# Patient Record
Sex: FEMALE | HISPANIC OR LATINO | Employment: UNEMPLOYED | URBAN - METROPOLITAN AREA
[De-identification: names, ages, dates, MRNs, and addresses within clinical notes are randomized per-mention and may not be internally consistent; named-entity substitution may affect disease eponyms.]

---

## 2019-04-11 ENCOUNTER — OFFICE VISIT (OUTPATIENT)
Dept: URGENT CARE | Facility: PHYSICIAN GROUP | Age: 19
End: 2019-04-11
Payer: COMMERCIAL

## 2019-04-11 VITALS
WEIGHT: 135 LBS | HEART RATE: 76 BPM | OXYGEN SATURATION: 97 % | RESPIRATION RATE: 16 BRPM | SYSTOLIC BLOOD PRESSURE: 116 MMHG | TEMPERATURE: 98.7 F | DIASTOLIC BLOOD PRESSURE: 80 MMHG

## 2019-04-11 DIAGNOSIS — J02.9 SORE THROAT: ICD-10-CM

## 2019-04-11 DIAGNOSIS — J03.90 ACUTE TONSILLITIS, UNSPECIFIED ETIOLOGY: ICD-10-CM

## 2019-04-11 DIAGNOSIS — R19.7 DIARRHEA, UNSPECIFIED TYPE: ICD-10-CM

## 2019-04-11 PROCEDURE — 99214 OFFICE O/P EST MOD 30 MIN: CPT | Performed by: NURSE PRACTITIONER

## 2019-04-11 RX ORDER — AZITHROMYCIN 250 MG/1
TABLET, FILM COATED ORAL
Qty: 6 TAB | Refills: 0 | Status: SHIPPED | OUTPATIENT
Start: 2019-04-11 | End: 2021-07-15

## 2019-04-11 ASSESSMENT — ENCOUNTER SYMPTOMS
DIZZINESS: 0
EYE REDNESS: 0
ABDOMINAL PAIN: 0
DIARRHEA: 1
EYE DISCHARGE: 0
SINUS PAIN: 0
CHILLS: 1
SPUTUM PRODUCTION: 0
NAUSEA: 0
NECK PAIN: 0
HEADACHES: 0
SORE THROAT: 1
COUGH: 0
FEVER: 1
SHORTNESS OF BREATH: 0
VOMITING: 0

## 2019-04-11 ASSESSMENT — LIFESTYLE VARIABLES: SUBSTANCE_ABUSE: 0

## 2019-04-11 NOTE — PROGRESS NOTES
Subjective:      Celia Spangler is a 19 y.o. female who presents with Pharyngitis (x 5 days,hurts to swollow,diarrhea x 1 week)    Denies past medical, surgical or family history that is significant to today's problem.   RX or OTC medications-- reviewed with patient today.   No Known Allergies          HPI this a new problem.  Alee is a 19-year-old female who presents with sore throat for 5 days.  It is getting significantly worse for the past 24 hours.  She has had subjective fevers and sweats for 3 days.  She has had intermittent diarrhea for 1 week-less than 3 times per day.  It hurts every time time she tries to swallow.  She denies cough, body aches(except for when she has the fever), nasal congestion.  She has taken over-the-counter antipyretics that of reduced her fever.  She has no other aggravating or alleviating factors.  No recent travel, antibiotic use or ill contacts.    Review of Systems   Constitutional: Positive for chills and fever. Negative for malaise/fatigue.   HENT: Positive for sore throat. Negative for congestion, ear pain and sinus pain.    Eyes: Negative for discharge and redness.   Respiratory: Negative for cough, sputum production and shortness of breath.    Cardiovascular: Negative for chest pain.   Gastrointestinal: Positive for diarrhea. Negative for abdominal pain, nausea and vomiting.   Genitourinary: Negative for dysuria and urgency.   Musculoskeletal: Negative for neck pain.   Neurological: Negative for dizziness and headaches.   Endo/Heme/Allergies: Negative for environmental allergies.   Psychiatric/Behavioral: Negative for substance abuse.   All other systems reviewed and are negative.         Objective:     /80 (BP Location: Right arm, Patient Position: Sitting, BP Cuff Size: Adult)   Pulse 76   Temp 37.1 °C (98.7 °F) (Temporal)   Resp 16   Wt 61.2 kg (135 lb)   SpO2 97%      Physical Exam   Constitutional: She is oriented to person, place, and time. Vital signs  are normal. She appears well-developed and well-nourished.  Non-toxic appearance. No distress.   HENT:   Head: Normocephalic.   Right Ear: Hearing, tympanic membrane, external ear and ear canal normal.   Left Ear: Hearing, tympanic membrane, external ear and ear canal normal.   Nose: Nose normal. Right sinus exhibits no frontal sinus tenderness. Left sinus exhibits no frontal sinus tenderness.   Mouth/Throat: Uvula is midline and mucous membranes are normal. No uvula swelling. Posterior oropharyngeal erythema present. No oropharyngeal exudate, posterior oropharyngeal edema or tonsillar abscesses. Tonsils are 2+ on the right. Tonsils are 2+ on the left. No tonsillar exudate.   Eyes: Pupils are equal, round, and reactive to light. Lids are normal.   Neck: Trachea normal, normal range of motion, full passive range of motion without pain and phonation normal. Neck supple.   Cardiovascular: Normal rate, regular rhythm and normal pulses.    Pulmonary/Chest: Effort normal and breath sounds normal. No respiratory distress.   Abdominal: Soft. Bowel sounds are normal. There is no hepatosplenomegaly. There is generalized tenderness. There is no rigidity, no rebound, no guarding and no CVA tenderness.   Musculoskeletal: Normal range of motion.   Lymphadenopathy:        Head (right side): Tonsillar adenopathy present. No submental and no submandibular adenopathy present.        Head (left side): No submental, no submandibular and no tonsillar adenopathy present.     She has no cervical adenopathy.        Right: No supraclavicular adenopathy present.        Left: No supraclavicular adenopathy present.   Neurological: She is alert and oriented to person, place, and time. She has normal reflexes.   Skin: Skin is warm and dry.   Psychiatric: She has a normal mood and affect. Her speech is normal and behavior is normal.   Nursing note and vitals reviewed.              Assessment/Plan:     1. Acute tonsillitis, unspecified etiology  -  azithromycin (ZITHROMAX) 250 MG Tab; Take 2 tablets PO on day one, then 1 tablet PO on day two to five.  Dispense: 6 Tab; Refill: 0    2. Diarrhea, unspecified type    OTC anti-diarrhea medication PRN. Dosage and directions per     3. Sore throat  - Alum & Mag Hydroxide-Simeth (MBX) Suspension; Take 5 mL by mouth every 6 hours as needed (gargle and spit or swallow).  Dispense: 120 mL; Refill: 0    OTC Antipyretic of choice (Acetaminophen, Ibuprofen) for fevers greater than or equal to 101.5 degrees.     Return to clinic or PCP  4-5  days if current symptoms are not resolving in a satisfactory manner or sooner if new or worsening symptoms occur.   Patient was advised of signs and symptoms which would warrant further evaluation and /or emergent evaluation in ER.  Verbalized agreement with this treatment plan and seemed to understand without barriers. Questions were encouraged and answered to patients satisfaction.     Aftercare instructions were given to pt    Keep well hydrated

## 2019-04-11 NOTE — PATIENT INSTRUCTIONS
Tonsillitis  Tonsillitis is an infection of the throat that causes the tonsils to become red, tender, and swollen. Tonsils are collections of lymphoid tissue at the back of the throat. Each tonsil has crevices (crypts). Tonsils help fight nose and throat infections and keep infection from spreading to other parts of the body for the first 18 months of life.  What are the causes?  Sudden (acute) tonsillitis is usually caused by infection with streptococcal bacteria. Long-lasting (chronic) tonsillitis occurs when the crypts of the tonsils become filled with pieces of food and bacteria, which makes it easy for the tonsils to become repeatedly infected.  What are the signs or symptoms?  Symptoms of tonsillitis include:  · A sore throat, with possible difficulty swallowing.  · White patches on the tonsils.  · Fever.  · Tiredness.  · New episodes of snoring during sleep, when you did not snore before.  · Small, foul-smelling, yellowish-white pieces of material (tonsilloliths) that you occasionally cough up or spit out. The tonsilloliths can also cause you to have bad breath.  How is this diagnosed?  Tonsillitis can be diagnosed through a physical exam. Diagnosis can be confirmed with the results of lab tests, including a throat culture.  How is this treated?  The goals of tonsillitis treatment include the reduction of the severity and duration of symptoms and prevention of associated conditions. Symptoms of tonsillitis can be improved with the use of steroids to reduce the swelling. Tonsillitis caused by bacteria can be treated with antibiotic medicines. Usually, treatment with antibiotic medicines is started before the cause of the tonsillitis is known. However, if it is determined that the cause is not bacterial, antibiotic medicines will not treat the tonsillitis. If attacks of tonsillitis are severe and frequent, your health care provider may recommend surgery to remove the tonsils (tonsillectomy).  Follow these  instructions at home:  · Rest as much as possible and get plenty of sleep.  · Drink plenty of fluids. While the throat is very sore, eat soft foods or liquids, such as sherbet, soups, or instant breakfast drinks.  · Eat frozen ice pops.  · Gargle with a warm or cold liquid to help soothe the throat. Mix 1/4 teaspoon of salt and 1/4 teaspoon of baking soda in 8 oz of water.  Contact a health care provider if:  · Large, tender lumps develop in your neck.  · A rash develops.  · A green, yellow-brown, or bloody substance is coughed up.  · You are unable to swallow liquids or food for 24 hours.  · You notice that only one of the tonsils is swollen.  Get help right away if:  · You develop any new symptoms such as vomiting, severe headache, stiff neck, chest pain, or trouble breathing or swallowing.  · You have severe throat pain along with drooling or voice changes.  · You have severe pain, unrelieved with recommended medications.  · You are unable to fully open the mouth.  · You develop redness, swelling, or severe pain anywhere in the neck.  · You have a fever.  This information is not intended to replace advice given to you by your health care provider. Make sure you discuss any questions you have with your health care provider.  Document Released: 09/27/2006 Document Revised: 05/25/2017 Document Reviewed: 06/06/2014  [employer] Interactive Patient Education © 2017 [employer]        Viral Gastroenteritis, Adult  Viral gastroenteritis is also known as the stomach flu. This condition is caused by various viruses. These viruses can be passed from person to person very easily (are very contagious). This condition may affect your stomach, small intestine, and large intestine. It can cause sudden watery diarrhea, fever, and vomiting.  Diarrhea and vomiting can make you feel weak and cause you to become dehydrated. You may not be able to keep fluids down. Dehydration can make you tired and thirsty, cause you to have a dry  mouth, and decrease how often you urinate. Older adults and people with other diseases or a weak immune system are at higher risk for dehydration.  It is important to replace the fluids that you lose from diarrhea and vomiting. If you become severely dehydrated, you may need to get fluids through an IV tube.  What are the causes?  Gastroenteritis is caused by various viruses, including rotavirus and norovirus. Norovirus is the most common cause in adults.  You can get sick by eating food, drinking water, or touching a surface contaminated with one of these viruses. You can also get sick from sharing utensils or other personal items with an infected person.  What increases the risk?  This condition is more likely to develop in people:  · Who have a weak defense system (immune system).  · Who live with one or more children who are younger than 2 years old.  · Who live in a nursing home.  · Who go on cruise ships.  What are the signs or symptoms?  Symptoms of this condition start suddenly 1-2 days after exposure to a virus. Symptoms may last a few days or as long as a week. The most common symptoms are watery diarrhea and vomiting. Other symptoms include:  · Fever.  · Headache.  · Fatigue.  · Pain in the abdomen.  · Chills.  · Weakness.  · Nausea.  · Muscle aches.  · Loss of appetite.  How is this diagnosed?  This condition is diagnosed with a medical history and physical exam. You may also have a stool test to check for viruses or other infections.  How is this treated?  This condition typically goes away on its own. The focus of treatment is to restore lost fluids (rehydration). Your health care provider may recommend that you take an oral rehydration solution (ORS) to replace important salts and minerals (electrolytes) in your body. Severe cases of this condition may require giving fluids through an IV tube.  Treatment may also include medicine to help with your symptoms.  Follow these instructions at home:  Follow  instructions from your health care provider about how to care for yourself at home.  Eating and drinking  Follow these recommendations as told by your health care provider:  · Take an ORS. This is a drink that is sold at pharmacies and retail stores.  · Drink clear fluids in small amounts as you are able. Clear fluids include water, ice chips, diluted fruit juice, and low-calorie sports drinks.  · Eat bland, easy-to-digest foods in small amounts as you are able. These foods include bananas, applesauce, rice, lean meats, toast, and crackers.  · Avoid fluids that contain a lot of sugar or caffeine, such as energy drinks, sports drinks, and soda.  · Avoid alcohol.  · Avoid spicy or fatty foods.  General instructions  · Drink enough fluid to keep your urine clear or pale yellow.  · Wash your hands often. If soap and water are not available, use hand .  · Make sure that all people in your household wash their hands well and often.  · Take over-the-counter and prescription medicines only as told by your health care provider.  · Rest at home while you recover.  · Watch your condition for any changes.  · Take a warm bath to relieve any burning or pain from frequent diarrhea episodes.  · Keep all follow-up visits as told by your health care provider. This is important.  Contact a health care provider if:  · You cannot keep fluids down.  · Your symptoms get worse.  · You have new symptoms.  · You feel light-headed or dizzy.  · You have muscle cramps.  Get help right away if:  · You have chest pain.  · You feel extremely weak or you faint.  · You see blood in your vomit.  · Your vomit looks like coffee grounds.  · You have bloody or black stools or stools that look like tar.  · You have a severe headache, a stiff neck, or both.  · You have a rash.  · You have severe pain, cramping, or bloating in your abdomen.  · You have trouble breathing or you are breathing very quickly.  · Your heart is beating very  quickly.  · Your skin feels cold and clammy.  · You feel confused.  · You have pain when you urinate.  · You have signs of dehydration, such as:  ¨ Dark urine, very little urine, or no urine.  ¨ Cracked lips.  ¨ Dry mouth.  ¨ Sunken eyes.  ¨ Sleepiness.  ¨ Weakness.  This information is not intended to replace advice given to you by your health care provider. Make sure you discuss any questions you have with your health care provider.  Document Released: 12/18/2006 Document Revised: 05/31/2017 Document Reviewed: 08/23/2016  [employer] Interactive Patient Education © 2017 [employer]

## 2019-04-11 NOTE — LETTER
April 11, 2019       Patient: Celia Spangler   YOB: 2000   Date of Visit: 4/11/2019         To Whom It May Concern:    It is my medical opinion that Celia Spangler remain out of work until 04/12/19 due to medical illness.              Sincerely,          KAYE Vitale  Electronically Signed

## 2019-11-09 ENCOUNTER — OFFICE VISIT (OUTPATIENT)
Dept: URGENT CARE | Facility: PHYSICIAN GROUP | Age: 19
End: 2019-11-09
Payer: COMMERCIAL

## 2019-11-09 VITALS
HEIGHT: 62 IN | DIASTOLIC BLOOD PRESSURE: 58 MMHG | OXYGEN SATURATION: 98 % | HEART RATE: 84 BPM | RESPIRATION RATE: 16 BRPM | BODY MASS INDEX: 24.66 KG/M2 | SYSTOLIC BLOOD PRESSURE: 100 MMHG | WEIGHT: 134 LBS | TEMPERATURE: 98.9 F

## 2019-11-09 DIAGNOSIS — K52.9 GASTROENTERITIS: ICD-10-CM

## 2019-11-09 PROCEDURE — 99214 OFFICE O/P EST MOD 30 MIN: CPT | Performed by: PHYSICIAN ASSISTANT

## 2019-11-09 RX ORDER — OMEPRAZOLE 20 MG/1
20 CAPSULE, DELAYED RELEASE ORAL DAILY
COMMUNITY
End: 2021-07-15

## 2019-11-09 RX ORDER — IBUPROFEN 200 MG
200 TABLET ORAL EVERY 6 HOURS PRN
COMMUNITY
End: 2023-03-16

## 2019-11-09 RX ORDER — ONDANSETRON 4 MG/1
4 TABLET, FILM COATED ORAL EVERY 6 HOURS PRN
Qty: 20 TAB | Refills: 1 | Status: SHIPPED | OUTPATIENT
Start: 2019-11-09 | End: 2021-07-15

## 2019-11-09 ASSESSMENT — ENCOUNTER SYMPTOMS
SORE THROAT: 0
HEARTBURN: 1
FEVER: 0
SHORTNESS OF BREATH: 0
ABDOMINAL PAIN: 0
WHEEZING: 0
NERVOUS/ANXIOUS: 0
BLOOD IN STOOL: 0
VOMITING: 0
NAUSEA: 0
CHILLS: 0
SPUTUM PRODUCTION: 0
DIARRHEA: 0
CONSTIPATION: 0
COUGH: 0

## 2019-11-09 NOTE — LETTER
November 9, 2019       Patient: Ariela Magallanes   YOB: 2000   Date of Visit: 11/9/2019         To Whom It May Concern:    It is my medical opinion that Ariela Magallanes should be excused from work for today and tomorrow due to illness.      If you have any questions or concerns, please don't hesitate to call [phone]          Sincerely,          León Bella P.A.-C.  Electronically Signed

## 2019-11-10 NOTE — PROGRESS NOTES
"Subjective:   Ariela Magallanes  is a 19 y.o. female who presents for Nausea (chills, body aches, x 8 hours has flu vaccine on board)        Patient comes clinic describing episode of diarrhea sensation of anxiety that occurred this morning because her concern.  She denies fevers chills but notes awoke feeling \"shaky\" with loose stool.  Denies blood per stool noted diarrhea of 2-3 episodes this morning prior to her work to start time.  She reports calling into work and shortly thereafter having progressive resolution of symptoms over the course of the day.  She denies fevers chills.  She denies sore throat cough or ear pain.  She denies dysuria frequency urgency or hematuria.  She notes all symptoms have completely resolved over the course of the day.  She denies suspect food sources, spoiled foods, others with the same symptoms, travel out of the country, drinking preparing water from outdoor sources or new medications recently.  She is tried no treatments but has had progressive resolution of all symptoms.  Notes past medical history of GERD she reports taking omeprazole today with improved symptoms.    Review of Systems   Constitutional: Negative for chills and fever.   HENT: Negative for congestion, ear pain and sore throat.    Respiratory: Negative for cough, sputum production, shortness of breath and wheezing.    Gastrointestinal: Positive for heartburn. Negative for abdominal pain, blood in stool, constipation, diarrhea ( resolved), melena, nausea and vomiting.   Genitourinary: Negative.  Negative for dysuria, frequency and hematuria.   Skin: Negative for rash.   Psychiatric/Behavioral: The patient is not nervous/anxious ( resolved).      No Known Allergies   Objective:   /58 (BP Location: Left arm, Patient Position: Sitting, BP Cuff Size: Adult)   Pulse 84   Temp 37.2 °C (98.9 °F) (Temporal)   Resp 16   Ht 1.575 m (5' 2\")   Wt 60.8 kg (134 lb)   LMP 10/19/2019 (Approximate)   SpO2 98%   BMI " 24.51 kg/m²   Physical Exam  Vitals signs and nursing note reviewed.   Constitutional:       General: She is not in acute distress.     Appearance: Normal appearance. She is well-developed. She is not diaphoretic.   HENT:      Head: Normocephalic and atraumatic.      Right Ear: Tympanic membrane, ear canal and external ear normal.      Left Ear: Tympanic membrane, ear canal and external ear normal.      Nose: Nose normal.      Mouth/Throat:      Pharynx: Uvula midline. Posterior oropharyngeal erythema ( mild PND) present. No oropharyngeal exudate.      Tonsils: No tonsillar abscesses.   Eyes:      General: Lids are normal. No scleral icterus.        Right eye: No discharge.         Left eye: No discharge.      Conjunctiva/sclera: Conjunctivae normal.   Neck:      Musculoskeletal: Neck supple.   Pulmonary:      Effort: Pulmonary effort is normal. No respiratory distress.      Breath sounds: No decreased breath sounds, wheezing, rhonchi or rales.   Abdominal:      General: Bowel sounds are increased.      Palpations: Abdomen is soft. Abdomen is not rigid.      Tenderness: There is no tenderness. There is no guarding or rebound. Negative signs include Tobar's sign and McBurney's sign.   Musculoskeletal: Normal range of motion.   Lymphadenopathy:      Cervical: No cervical adenopathy.   Skin:     General: Skin is warm and dry.      Coloration: Skin is not pale.      Findings: No erythema.   Neurological:      Mental Status: She is alert and oriented to person, place, and time. She is not disoriented.   Psychiatric:         Speech: Speech normal.         Behavior: Behavior normal.           Assessment/Plan:   1. Gastroenteritis  - ondansetron (ZOFRAN) 4 MG Tab tablet; Take 1 Tab by mouth every 6 hours as needed for Nausea/Vomiting.  Dispense: 20 Tab; Refill: 1    Other orders  - omeprazole (PRILOSEC) 20 MG delayed-release capsule; Take 20 mg by mouth every day.  - ibuprofen (MOTRIN) 200 MG Tab; Take 200 mg by mouth  every 6 hours as needed.  Supportive care is reviewed with patient/caregiver - recommend to push PO fluids and electrolytes, suspect mild gastroenteritis, samples of fluid resuscitation and advancing brat diet are reviewed with patient she expresses understanding  Return to clinic with lack of resolution or progression of symptoms.      Differential diagnosis, natural history, supportive care, and indications for immediate follow-up discussed.

## 2020-01-07 ENCOUNTER — HOSPITAL ENCOUNTER (OUTPATIENT)
Dept: LAB | Facility: MEDICAL CENTER | Age: 20
End: 2020-01-07
Attending: PHYSICIAN ASSISTANT
Payer: COMMERCIAL

## 2020-01-07 LAB
ALBUMIN SERPL BCP-MCNC: 4.4 G/DL (ref 3.2–4.9)
ALBUMIN/GLOB SERPL: 1.8 G/DL
ALP SERPL-CCNC: 53 U/L (ref 30–99)
ALT SERPL-CCNC: 10 U/L (ref 2–50)
AMYLASE SERPL-CCNC: 35 U/L (ref 20–103)
ANION GAP SERPL CALC-SCNC: 8 MMOL/L (ref 0–11.9)
AST SERPL-CCNC: 18 U/L (ref 12–45)
BASOPHILS # BLD AUTO: 1.2 % (ref 0–1.8)
BASOPHILS # BLD: 0.08 K/UL (ref 0–0.12)
BILIRUB SERPL-MCNC: 0.6 MG/DL (ref 0.1–1.5)
BUN SERPL-MCNC: 10 MG/DL (ref 8–22)
CALCIUM SERPL-MCNC: 9.1 MG/DL (ref 8.5–10.5)
CHLORIDE SERPL-SCNC: 106 MMOL/L (ref 96–112)
CHOLEST SERPL-MCNC: 151 MG/DL (ref 100–199)
CO2 SERPL-SCNC: 24 MMOL/L (ref 20–33)
CREAT SERPL-MCNC: 0.69 MG/DL (ref 0.5–1.4)
EOSINOPHIL # BLD AUTO: 0.18 K/UL (ref 0–0.51)
EOSINOPHIL NFR BLD: 2.8 % (ref 0–6.9)
ERYTHROCYTE [DISTWIDTH] IN BLOOD BY AUTOMATED COUNT: 43.2 FL (ref 35.9–50)
FASTING STATUS PATIENT QL REPORTED: NORMAL
GLOBULIN SER CALC-MCNC: 2.5 G/DL (ref 1.9–3.5)
GLUCOSE SERPL-MCNC: 85 MG/DL (ref 65–99)
HCT VFR BLD AUTO: 41 % (ref 37–47)
HDLC SERPL-MCNC: 69 MG/DL
HGB BLD-MCNC: 13 G/DL (ref 12–16)
IMM GRANULOCYTES # BLD AUTO: 0.01 K/UL (ref 0–0.11)
IMM GRANULOCYTES NFR BLD AUTO: 0.2 % (ref 0–0.9)
LDLC SERPL CALC-MCNC: 70 MG/DL
LIPASE SERPL-CCNC: 11 U/L (ref 11–82)
LYMPHOCYTES # BLD AUTO: 1.92 K/UL (ref 1–4.8)
LYMPHOCYTES NFR BLD: 29.9 % (ref 22–41)
MCH RBC QN AUTO: 30.1 PG (ref 27–33)
MCHC RBC AUTO-ENTMCNC: 31.7 G/DL (ref 33.6–35)
MCV RBC AUTO: 94.9 FL (ref 81.4–97.8)
MONOCYTES # BLD AUTO: 0.47 K/UL (ref 0–0.85)
MONOCYTES NFR BLD AUTO: 7.3 % (ref 0–13.4)
NEUTROPHILS # BLD AUTO: 3.76 K/UL (ref 2–7.15)
NEUTROPHILS NFR BLD: 58.6 % (ref 44–72)
NRBC # BLD AUTO: 0 K/UL
NRBC BLD-RTO: 0 /100 WBC
PLATELET # BLD AUTO: 264 K/UL (ref 164–446)
PMV BLD AUTO: 11.6 FL (ref 9–12.9)
POTASSIUM SERPL-SCNC: 3.9 MMOL/L (ref 3.6–5.5)
PROT SERPL-MCNC: 6.9 G/DL (ref 6–8.2)
RBC # BLD AUTO: 4.32 M/UL (ref 4.2–5.4)
SODIUM SERPL-SCNC: 138 MMOL/L (ref 135–145)
T3FREE SERPL-MCNC: 3.45 PG/ML (ref 2.4–4.2)
T4 FREE SERPL-MCNC: 0.72 NG/DL (ref 0.53–1.43)
TRIGL SERPL-MCNC: 60 MG/DL (ref 0–149)
TSH SERPL DL<=0.005 MIU/L-ACNC: 0.88 UIU/ML (ref 0.38–5.33)
WBC # BLD AUTO: 6.4 K/UL (ref 4.8–10.8)

## 2020-01-07 PROCEDURE — 84481 FREE ASSAY (FT-3): CPT

## 2020-01-07 PROCEDURE — 83690 ASSAY OF LIPASE: CPT

## 2020-01-07 PROCEDURE — 85025 COMPLETE CBC W/AUTO DIFF WBC: CPT

## 2020-01-07 PROCEDURE — 84443 ASSAY THYROID STIM HORMONE: CPT

## 2020-01-07 PROCEDURE — 80053 COMPREHEN METABOLIC PANEL: CPT

## 2020-01-07 PROCEDURE — 36415 COLL VENOUS BLD VENIPUNCTURE: CPT

## 2020-01-07 PROCEDURE — 82150 ASSAY OF AMYLASE: CPT

## 2020-01-07 PROCEDURE — 84439 ASSAY OF FREE THYROXINE: CPT

## 2020-01-07 PROCEDURE — 80061 LIPID PANEL: CPT

## 2021-07-15 ENCOUNTER — OFFICE VISIT (OUTPATIENT)
Dept: URGENT CARE | Facility: PHYSICIAN GROUP | Age: 21
End: 2021-07-15
Payer: COMMERCIAL

## 2021-07-15 ENCOUNTER — HOSPITAL ENCOUNTER (OUTPATIENT)
Dept: LAB | Facility: MEDICAL CENTER | Age: 21
End: 2021-07-15
Attending: PHYSICIAN ASSISTANT
Payer: COMMERCIAL

## 2021-07-15 VITALS
DIASTOLIC BLOOD PRESSURE: 78 MMHG | HEART RATE: 82 BPM | OXYGEN SATURATION: 99 % | TEMPERATURE: 98 F | RESPIRATION RATE: 16 BRPM | BODY MASS INDEX: 25.4 KG/M2 | WEIGHT: 138 LBS | HEIGHT: 62 IN | SYSTOLIC BLOOD PRESSURE: 112 MMHG

## 2021-07-15 DIAGNOSIS — R11.0 NAUSEA: ICD-10-CM

## 2021-07-15 DIAGNOSIS — R10.13 EPIGASTRIC PAIN: ICD-10-CM

## 2021-07-15 LAB
ALBUMIN SERPL BCP-MCNC: 4.6 G/DL (ref 3.2–4.9)
ALBUMIN/GLOB SERPL: 1.8 G/DL
ALP SERPL-CCNC: 63 U/L (ref 30–99)
ALT SERPL-CCNC: 8 U/L (ref 2–50)
ANION GAP SERPL CALC-SCNC: 11 MMOL/L (ref 7–16)
APPEARANCE UR: NORMAL
AST SERPL-CCNC: 17 U/L (ref 12–45)
BASOPHILS # BLD AUTO: 1.7 % (ref 0–1.8)
BASOPHILS # BLD: 0.13 K/UL (ref 0–0.12)
BILIRUB SERPL-MCNC: 0.2 MG/DL (ref 0.1–1.5)
BILIRUB UR STRIP-MCNC: NORMAL MG/DL
BUN SERPL-MCNC: 7 MG/DL (ref 8–22)
CALCIUM SERPL-MCNC: 9.1 MG/DL (ref 8.5–10.5)
CHLORIDE SERPL-SCNC: 106 MMOL/L (ref 96–112)
CO2 SERPL-SCNC: 24 MMOL/L (ref 20–33)
COLOR UR AUTO: YELLOW
CREAT SERPL-MCNC: 0.62 MG/DL (ref 0.5–1.4)
EOSINOPHIL # BLD AUTO: 0.14 K/UL (ref 0–0.51)
EOSINOPHIL NFR BLD: 1.8 % (ref 0–6.9)
ERYTHROCYTE [DISTWIDTH] IN BLOOD BY AUTOMATED COUNT: 44.3 FL (ref 35.9–50)
GLOBULIN SER CALC-MCNC: 2.5 G/DL (ref 1.9–3.5)
GLUCOSE SERPL-MCNC: 91 MG/DL (ref 65–99)
GLUCOSE UR STRIP.AUTO-MCNC: NORMAL MG/DL
HCT VFR BLD AUTO: 42.7 % (ref 37–47)
HGB BLD-MCNC: 13.6 G/DL (ref 12–16)
INT CON NEG: NORMAL
INT CON POS: NORMAL
KETONES UR STRIP.AUTO-MCNC: NORMAL MG/DL
LEUKOCYTE ESTERASE UR QL STRIP.AUTO: NORMAL
LIPASE SERPL-CCNC: 26 U/L (ref 11–82)
LYMPHOCYTES # BLD AUTO: 1.76 K/UL (ref 1–4.8)
LYMPHOCYTES NFR BLD: 23.5 % (ref 22–41)
MANUAL DIFF BLD: NORMAL
MCH RBC QN AUTO: 30.8 PG (ref 27–33)
MCHC RBC AUTO-ENTMCNC: 31.9 G/DL (ref 33.6–35)
MCV RBC AUTO: 96.6 FL (ref 81.4–97.8)
MONOCYTES # BLD AUTO: 0.59 K/UL (ref 0–0.85)
MONOCYTES NFR BLD AUTO: 7.8 % (ref 0–13.4)
MORPHOLOGY BLD-IMP: NORMAL
NEUTROPHILS # BLD AUTO: 4.89 K/UL (ref 2–7.15)
NEUTROPHILS NFR BLD: 65.2 % (ref 44–72)
NITRITE UR QL STRIP.AUTO: NORMAL
NRBC # BLD AUTO: 0 K/UL
NRBC BLD-RTO: 0 /100 WBC
PH UR STRIP.AUTO: 7.5 [PH] (ref 5–8)
PLATELET # BLD AUTO: 312 K/UL (ref 164–446)
PLATELET BLD QL SMEAR: NORMAL
PMV BLD AUTO: 11 FL (ref 9–12.9)
POC URINE PREGNANCY TEST: NORMAL
POTASSIUM SERPL-SCNC: 4.9 MMOL/L (ref 3.6–5.5)
PROT SERPL-MCNC: 7.1 G/DL (ref 6–8.2)
PROT UR QL STRIP: NORMAL MG/DL
RBC # BLD AUTO: 4.42 M/UL (ref 4.2–5.4)
RBC BLD AUTO: PRESENT
RBC UR QL AUTO: NORMAL
SODIUM SERPL-SCNC: 141 MMOL/L (ref 135–145)
SP GR UR STRIP.AUTO: 1.02
UROBILINOGEN UR STRIP-MCNC: 0.2 MG/DL
VARIANT LYMPHS BLD QL SMEAR: NORMAL
WBC # BLD AUTO: 7.5 K/UL (ref 4.8–10.8)

## 2021-07-15 PROCEDURE — 99214 OFFICE O/P EST MOD 30 MIN: CPT | Performed by: PHYSICIAN ASSISTANT

## 2021-07-15 PROCEDURE — 85027 COMPLETE CBC AUTOMATED: CPT

## 2021-07-15 PROCEDURE — 83690 ASSAY OF LIPASE: CPT

## 2021-07-15 PROCEDURE — 36415 COLL VENOUS BLD VENIPUNCTURE: CPT

## 2021-07-15 PROCEDURE — 85007 BL SMEAR W/DIFF WBC COUNT: CPT

## 2021-07-15 PROCEDURE — 80053 COMPREHEN METABOLIC PANEL: CPT

## 2021-07-15 PROCEDURE — 81025 URINE PREGNANCY TEST: CPT | Performed by: PHYSICIAN ASSISTANT

## 2021-07-15 PROCEDURE — 81002 URINALYSIS NONAUTO W/O SCOPE: CPT | Performed by: PHYSICIAN ASSISTANT

## 2021-07-15 RX ORDER — OMEPRAZOLE 40 MG/1
40 CAPSULE, DELAYED RELEASE ORAL DAILY
Qty: 30 CAPSULE | Refills: 0 | Status: SHIPPED | OUTPATIENT
Start: 2021-07-15 | End: 2023-03-16

## 2021-07-15 RX ORDER — FAMOTIDINE 20 MG/1
20 TABLET, FILM COATED ORAL 2 TIMES DAILY
COMMUNITY
End: 2023-03-16

## 2021-07-15 RX ORDER — CITALOPRAM 20 MG/1
20 TABLET ORAL DAILY
COMMUNITY
Start: 2021-07-07 | End: 2023-03-16

## 2021-07-15 ASSESSMENT — ENCOUNTER SYMPTOMS
ABDOMINAL PAIN: 1
FEVER: 0
VOMITING: 0
CHILLS: 0
DIARRHEA: 1

## 2021-07-15 ASSESSMENT — FIBROSIS 4 INDEX: FIB4 SCORE: 0.5

## 2021-07-15 NOTE — PROGRESS NOTES
"Subjective:   Ariela Magallanes Soto is a 21 y.o. female who presents for Abdominal Pain (middle of stomach, Pt states after she eats it bothers her; 1x wk. )        Patient presents with concerns of epigastric pain and loose bowel movements x1 week.  States symptoms began last Friday after eating a spicy meal.  She states that spicy foods tend to trigger these types of symptoms for her.  Epigastric pain is worse with eating and improved by time.  Pain is burning and sometimes radiates up her chest, consistent with prior episodes of heartburn.  Occasional nausea.  She denies vomiting, fevers, chills, lower abdominal pain.  She began treating with Pepcid and MiraLAX.  She believes that the Pepcid is helping but is not sure if the MiraLAX is doing anything.  She is not certain what MiraLAX is for.    Review of Systems   Constitutional: Negative for chills and fever.   Gastrointestinal: Positive for abdominal pain and diarrhea. Negative for vomiting.       PMH:  has no past medical history on file.  MEDS:   Current Outpatient Medications:   •  citalopram (CELEXA) 20 MG Tab, Take 20 mg by mouth every day., Disp: , Rfl:   •  famotidine (PEPCID) 20 MG Tab, Take 20 mg by mouth 2 times a day., Disp: , Rfl:   •  omeprazole (PRILOSEC) 40 MG delayed-release capsule, Take 1 capsule by mouth every day., Disp: 30 capsule, Rfl: 0  •  ibuprofen (MOTRIN) 200 MG Tab, Take 200 mg by mouth every 6 hours as needed., Disp: , Rfl:   ALLERGIES: No Known Allergies  SURGHX: History reviewed. No pertinent surgical history.  SOCHX:  reports that she has never smoked. She has never used smokeless tobacco. She reports current alcohol use. She reports previous drug use.  FH: Family history was reviewed, no pertinent findings to report   Objective:   /78 (BP Location: Right arm, Patient Position: Sitting, BP Cuff Size: Adult)   Pulse 82   Temp 36.7 °C (98 °F) (Temporal)   Resp 16   Ht 1.575 m (5' 2\")   Wt 62.6 kg (138 lb)   SpO2 99%   " BMI 25.24 kg/m²   Physical Exam  Vitals reviewed.   Constitutional:       General: She is not in acute distress.     Appearance: Normal appearance. She is well-developed. She is not toxic-appearing.   HENT:      Head: Normocephalic and atraumatic.      Right Ear: External ear normal.      Left Ear: External ear normal.      Nose: Nose normal.   Eyes:      General: Gaze aligned appropriately.   Cardiovascular:      Rate and Rhythm: Normal rate and regular rhythm.   Pulmonary:      Effort: Pulmonary effort is normal. No respiratory distress.      Breath sounds: No stridor.   Abdominal:      General: Abdomen is flat. Bowel sounds are normal.      Palpations: Abdomen is soft.      Tenderness: There is abdominal tenderness (mild with deep palpation). There is no right CVA tenderness, left CVA tenderness, guarding or rebound. Negative signs include Tobar's sign and McBurney's sign.   Musculoskeletal:      Cervical back: Neck supple.   Skin:     General: Skin is warm and dry.      Capillary Refill: Capillary refill takes less than 2 seconds.   Neurological:      Mental Status: She is alert and oriented to person, place, and time.      Comments: CN2-12 grossly intact   Psychiatric:         Speech: Speech normal.         Behavior: Behavior normal.         Labs:    Lab Results   Component Value Date/Time    WBC 6.4 01/07/2020 09:28 AM    RBC 4.32 01/07/2020 09:28 AM    HEMOGLOBIN 13.0 01/07/2020 09:28 AM    HEMATOCRIT 41.0 01/07/2020 09:28 AM    MCV 94.9 01/07/2020 09:28 AM    MCH 30.1 01/07/2020 09:28 AM    MCHC 31.7 (L) 01/07/2020 09:28 AM    MPV 11.6 01/07/2020 09:28 AM    NEUTSPOLYS 58.60 01/07/2020 09:28 AM    LYMPHOCYTES 29.90 01/07/2020 09:28 AM    MONOCYTES 7.30 01/07/2020 09:28 AM    EOSINOPHILS 2.80 01/07/2020 09:28 AM    BASOPHILS 1.20 01/07/2020 09:28 AM      Lab Results   Component Value Date/Time    SODIUM 138 01/07/2020 09:28 AM    POTASSIUM 3.9 01/07/2020 09:28 AM    CHLORIDE 106 01/07/2020 09:28 AM    CO2 24  01/07/2020 09:28 AM    GLUCOSE 85 01/07/2020 09:28 AM    BUN 10 01/07/2020 09:28 AM    CREATININE 0.69 01/07/2020 09:28 AM      Lipase 23  Assessment/Plan:   1. Epigastric pain  - CBC WITH DIFFERENTIAL; Future  - Comp Metabolic Panel; Future  - LIPASE; Future  - omeprazole (PRILOSEC) 40 MG delayed-release capsule; Take 1 capsule by mouth every day.  Dispense: 30 capsule; Refill: 0    2. Nausea  - POCT Urinalysis  - POCT Pregnancy  - CBC WITH DIFFERENTIAL; Future  - Comp Metabolic Panel; Future  - LIPASE; Future  - omeprazole (PRILOSEC) 40 MG delayed-release capsule; Take 1 capsule by mouth every day.  Dispense: 30 capsule; Refill: 0    Other orders  - citalopram (CELEXA) 20 MG Tab; Take 20 mg by mouth every day.  - famotidine (PEPCID) 20 MG Tab; Take 20 mg by mouth 2 times a day.    Pt well appearing and VSS. This does not look like an emergent intraabdominal pathology at this time. Hx and symptoms suggestive of GERD, but I will obtain labs to better evaluate for infectious, biliary and pancreatic etiologies.  Patient to avoid triggering foods.  PPI for the next 3 to 5 days.  Transition back to Pepcid as needed after symptoms calm down.  Stop MiraLAX-this is likely what is precipitating loose bowel movements.  Return and ED precautions reviewed.    Differential diagnosis, natural history, supportive care, and indications for immediate follow-up discussed.

## 2022-02-17 ENCOUNTER — OFFICE VISIT (OUTPATIENT)
Dept: URGENT CARE | Facility: PHYSICIAN GROUP | Age: 22
End: 2022-02-17
Payer: COMMERCIAL

## 2022-02-17 ENCOUNTER — HOSPITAL ENCOUNTER (OUTPATIENT)
Dept: RADIOLOGY | Facility: MEDICAL CENTER | Age: 22
End: 2022-02-17
Attending: NURSE PRACTITIONER
Payer: COMMERCIAL

## 2022-02-17 ENCOUNTER — TELEPHONE (OUTPATIENT)
Dept: URGENT CARE | Facility: PHYSICIAN GROUP | Age: 22
End: 2022-02-17

## 2022-02-17 VITALS
WEIGHT: 145 LBS | HEART RATE: 92 BPM | HEIGHT: 62 IN | BODY MASS INDEX: 26.68 KG/M2 | TEMPERATURE: 100 F | SYSTOLIC BLOOD PRESSURE: 102 MMHG | OXYGEN SATURATION: 96 % | RESPIRATION RATE: 16 BRPM | DIASTOLIC BLOOD PRESSURE: 78 MMHG

## 2022-02-17 DIAGNOSIS — B97.89 VIRAL RESPIRATORY INFECTION: ICD-10-CM

## 2022-02-17 DIAGNOSIS — R06.02 SOB (SHORTNESS OF BREATH): ICD-10-CM

## 2022-02-17 DIAGNOSIS — J98.8 VIRAL RESPIRATORY INFECTION: ICD-10-CM

## 2022-02-17 LAB
FLUAV+FLUBV AG SPEC QL IA: NORMAL
INT CON NEG: NORMAL
INT CON POS: NORMAL

## 2022-02-17 PROCEDURE — 71046 X-RAY EXAM CHEST 2 VIEWS: CPT

## 2022-02-17 PROCEDURE — 99213 OFFICE O/P EST LOW 20 MIN: CPT | Performed by: NURSE PRACTITIONER

## 2022-02-17 PROCEDURE — 87804 INFLUENZA ASSAY W/OPTIC: CPT | Performed by: NURSE PRACTITIONER

## 2022-02-17 RX ORDER — ALBUTEROL SULFATE 90 UG/1
1-2 AEROSOL, METERED RESPIRATORY (INHALATION) EVERY 6 HOURS PRN
Qty: 8.5 G | Refills: 0 | Status: SHIPPED | OUTPATIENT
Start: 2022-02-17

## 2022-02-17 RX ORDER — ESCITALOPRAM OXALATE 20 MG/1
20 TABLET ORAL DAILY
COMMUNITY

## 2022-02-17 RX ORDER — TRAZODONE HYDROCHLORIDE 50 MG/1
50 TABLET ORAL NIGHTLY
COMMUNITY
End: 2023-03-16

## 2022-02-17 RX ORDER — NAPROXEN 500 MG/1
TABLET ORAL
COMMUNITY
Start: 2021-12-05 | End: 2023-03-16

## 2022-02-17 RX ORDER — LAMOTRIGINE 100 MG/1
100 TABLET ORAL DAILY
COMMUNITY
End: 2023-03-16

## 2022-02-17 RX ORDER — LAMOTRIGINE 25 MG/1
TABLET ORAL
COMMUNITY
Start: 2021-12-07 | End: 2023-03-16

## 2022-02-17 ASSESSMENT — ENCOUNTER SYMPTOMS
SORE THROAT: 1
WHEEZING: 1
FEVER: 1
HEMOPTYSIS: 0
CHILLS: 1
DIARRHEA: 0
NAUSEA: 1
SHORTNESS OF BREATH: 1
SPUTUM PRODUCTION: 1
VOMITING: 0
COUGH: 1

## 2022-02-17 ASSESSMENT — FIBROSIS 4 INDEX: FIB4 SCORE: 0.44

## 2022-02-17 NOTE — LETTER
February 17, 2022         Patient: Ariela Magallanes Soto   YOB: 2000   Date of Visit: 2/17/2022           To Whom it May Concern:    Ariela Magallanes Soto was seen in my clinic on 2/17/2022. She may return to gym class or sports on 2/22/2022.    If you have any questions or concerns, please don't hesitate to call.        Sincerely,           ANILA Perez.  Electronically Signed

## 2022-02-18 NOTE — PATIENT INSTRUCTIONS
Symptomatic care.  -Oral hydration and rest.   -Cough control: nonpharmacologic options for cough relief such as throat lozenges, hot tea, honey.  -Over the counter expectorant as directed; Guaifenesin (Mucinex).  -Tylenol or ibuprofen for pain and fever as directed.     Seek emergency medical care immediately for: Trouble breathing, persistent pain or pressure in the chest, confusion, inability to wake or stay awake, bluish lips or face, persistent tachycardia (fast heart rate), prolonged dizziness, persistent high grade fevers. Follow up for prolonged cough, persistent wheezing, leg swelling, or any other concerns. Follow up with your Primary Care Provider.       Viral Respiratory Infection  A respiratory infection is an illness that affects part of the respiratory system, such as the lungs, nose, or throat. A respiratory infection that is caused by a virus is called a viral respiratory infection.  Common types of viral respiratory infections include:  · A cold.  · The flu (influenza).  · A respiratory syncytial virus (RSV) infection.  What are the causes?  This condition is caused by a virus.  What are the signs or symptoms?  Symptoms of this condition include:  · A stuffy or runny nose.  · Yellow or green nasal discharge.  · A cough.  · Sneezing.  · Fatigue.  · Achy muscles.  · A sore throat.  · Sweating or chills.  · A fever.  · A headache.  How is this diagnosed?  This condition may be diagnosed based on:  · Your symptoms.  · A physical exam.  · Testing of nasal swabs.  How is this treated?  This condition may be treated with medicines, such as:  · Antiviral medicine. This may shorten the length of time a person has symptoms.  · Expectorants. These make it easier to cough up mucus.  · Decongestant nasal sprays.  · Acetaminophen or NSAIDs to relieve fever and pain.  Antibiotic medicines are not prescribed for viral infections. This is because antibiotics are designed to kill bacteria. They are not effective  against viruses.  Follow these instructions at home:    Managing pain and congestion  · Take over-the-counter and prescription medicines only as told by your health care provider.  · If you have a sore throat, gargle with a salt-water mixture 3-4 times a day or as needed. To make a salt-water mixture, completely dissolve ½-1 tsp of salt in 1 cup of warm water.  · Use nose drops made from salt water to ease congestion and soften raw skin around your nose.  · Drink enough fluid to keep your urine pale yellow. This helps prevent dehydration and helps loosen up mucus.  General instructions  · Rest as much as possible.  · Do not drink alcohol.  · Do not use any products that contain nicotine or tobacco, such as cigarettes and e-cigarettes. If you need help quitting, ask your health care provider.  · Keep all follow-up visits as told by your health care provider. This is important.  How is this prevented?    · Get an annual flu shot. You may get the flu shot in late summer, fall, or winter. Ask your health care provider when you should get your flu shot.  · Avoid exposing others to your respiratory infection.  ? Stay home from work or school as told by your health care provider.  ? Wash your hands with soap and water often, especially after you cough or sneeze. If soap and water are not available, use alcohol-based hand .  · Avoid contact with people who are sick during cold and flu season. This is generally fall and winter.  Contact a health care provider if:  · Your symptoms last for 10 days or longer.  · Your symptoms get worse over time.  · You have a fever.  · You have severe sinus pain in your face or forehead.  · The glands in your jaw or neck become very swollen.  Get help right away if you:  · Feel pain or pressure in your chest.  · Have shortness of breath.  · Faint or feel like you will faint.  · Have severe and persistent vomiting.  · Feel confused or disoriented.  Summary  · A respiratory infection  is an illness that affects part of the respiratory system, such as the lungs, nose, or throat. A respiratory infection that is caused by a virus is called a viral respiratory infection.  · Common types of viral respiratory infections are a cold, influenza, and respiratory syncytial virus (RSV) infection.  · Symptoms of this condition include a stuffy or runny nose, cough, sneezing, fatigue, achy muscles, sore throat, and fevers or chills.  · Antibiotic medicines are not prescribed for viral infections. This is because antibiotics are designed to kill bacteria. They are not effective against viruses.  This information is not intended to replace advice given to you by your health care provider. Make sure you discuss any questions you have with your health care provider.  Document Released: 09/27/2006 Document Revised: 12/26/2019 Document Reviewed: 01/28/2019  [employer] Patient Education © 2020 [employer]      COVID-19  COVID-19 is a respiratory infection that is caused by a virus called severe acute respiratory syndrome coronavirus 2 (SARS-CoV-2). The disease is also known as coronavirus disease or novel coronavirus. In some people, the virus may not cause any symptoms. In others, it may cause a serious infection. The infection can get worse quickly and can lead to complications, such as:  · Pneumonia, or infection of the lungs.  · Acute respiratory distress syndrome or ARDS. This is fluid build-up in the lungs.  · Acute respiratory failure. This is a condition in which there is not enough oxygen passing from the lungs to the body.  · Sepsis or septic shock. This is a serious bodily reaction to an infection.  · Blood clotting problems.  · Secondary infections due to bacteria or fungus.  The virus that causes COVID-19 is contagious. This means that it can spread from person to person through droplets from coughs and sneezes (respiratory secretions).  What are the causes?  This illness is caused by a virus. You may  catch the virus by:  · Breathing in droplets from an infected person's cough or sneeze.  · Touching something, like a table or a doorknob, that was exposed to the virus (contaminated) and then touching your mouth, nose, or eyes.  What increases the risk?  Risk for infection  You are more likely to be infected with this virus if you:  · Live in or travel to an area with a COVID-19 outbreak.  · Come in contact with a sick person who recently traveled to an area with a COVID-19 outbreak.  · Provide care for or live with a person who is infected with COVID-19.  Risk for serious illness  You are more likely to become seriously ill from the virus if you:  · Are 65 years of age or older.  · Have a long-term disease that lowers your body's ability to fight infection (immunocompromised).  · Live in a nursing home or long-term care facility.  · Have a long-term (chronic) disease such as:  ? Chronic lung disease, including chronic obstructive pulmonary disease or asthma  ? Heart disease.  ? Diabetes.  ? Chronic kidney disease.  ? Liver disease.  · Are obese.  What are the signs or symptoms?  Symptoms of this condition can range from mild to severe. Symptoms may appear any time from 2 to 14 days after being exposed to the virus. They include:  · A fever.  · A cough.  · Difficulty breathing.  · Chills.  · Muscle pains.  · A sore throat.  · Loss of taste or smell.  Some people may also have stomach problems, such as nausea, vomiting, or diarrhea.  Other people may not have any symptoms of COVID-19.  How is this diagnosed?  This condition may be diagnosed based on:  · Your signs and symptoms, especially if:  ? You live in an area with a COVID-19 outbreak.  ? You recently traveled to or from an area where the virus is common.  ? You provide care for or live with a person who was diagnosed with COVID-19.  · A physical exam.  · Lab tests, which may include:  ? A nasal swab to take a sample of fluid from your nose.  ? A throat swab to  take a sample of fluid from your throat.  ? A sample of mucus from your lungs (sputum).  ? Blood tests.  · Imaging tests, which may include, X-rays, CT scan, or ultrasound.  How is this treated?  At present, there is no medicine to treat COVID-19. Medicines that treat other diseases are being used on a trial basis to see if they are effective against COVID-19.  Your health care provider will talk with you about ways to treat your symptoms. For most people, the infection is mild and can be managed at home with rest, fluids, and over-the-counter medicines.  Treatment for a serious infection usually takes places in a hospital intensive care unit (ICU). It may include one or more of the following treatments. These treatments are given until your symptoms improve.  · Receiving fluids and medicines through an IV.  · Supplemental oxygen. Extra oxygen is given through a tube in the nose, a face mask, or a bryant.  · Positioning you to lie on your stomach (prone position). This makes it easier for oxygen to get into the lungs.  · Continuous positive airway pressure (CPAP) or bi-level positive airway pressure (BPAP) machine. This treatment uses mild air pressure to keep the airways open. A tube that is connected to a motor delivers oxygen to the body.  · Ventilator. This treatment moves air into and out of the lungs by using a tube that is placed in your windpipe.  · Tracheostomy. This is a procedure to create a hole in the neck so that a breathing tube can be inserted.  · Extracorporeal membrane oxygenation (ECMO). This procedure gives the lungs a chance to recover by taking over the functions of the heart and lungs. It supplies oxygen to the body and removes carbon dioxide.  Follow these instructions at home:  Lifestyle  · If you are sick, stay home except to get medical care. Your health care provider will tell you how long to stay home. Call your health care provider before you go for medical care.  · Rest at home as told  by your health care provider.  · Do not use any products that contain nicotine or tobacco, such as cigarettes, e-cigarettes, and chewing tobacco. If you need help quitting, ask your health care provider.  · Return to your normal activities as told by your health care provider. Ask your health care provider what activities are safe for you.  General instructions  · Take over-the-counter and prescription medicines only as told by your health care provider.  · Drink enough fluid to keep your urine pale yellow.  · Keep all follow-up visits as told by your health care provider. This is important.  How is this prevented?    There is no vaccine to help prevent COVID-19 infection. However, there are steps you can take to protect yourself and others from this virus.  To protect yourself:   · Do not travel to areas where COVID-19 is a risk. The areas where COVID-19 is reported change often. To identify high-risk areas and travel restrictions, check the CDC travel website: wwwnc.cdc.gov/travel/notices  · If you live in, or must travel to, an area where COVID-19 is a risk, take precautions to avoid infection.  ? Stay away from people who are sick.  ? Wash your hands often with soap and water for 20 seconds. If soap and water are not available, use an alcohol-based hand .  ? Avoid touching your mouth, face, eyes, or nose.  ? Avoid going out in public, follow guidance from your state and local health authorities.  ? If you must go out in public, wear a cloth face covering or face mask.  ? Disinfect objects and surfaces that are frequently touched every day. This may include:  § Counters and tables.  § Doorknobs and light switches.  § Sinks and faucets.  § Electronics, such as phones, remote controls, keyboards, computers, and tablets.  To protect others:  If you have symptoms of COVID-19, take steps to prevent the virus from spreading to others.  · If you think you have a COVID-19 infection, contact your health care  provider right away. Tell your health care team that you think you may have a COVID-19 infection.  · Stay home. Leave your house only to seek medical care. Do not use public transport.  · Do not travel while you are sick.  · Wash your hands often with soap and water for 20 seconds. If soap and water are not available, use alcohol-based hand .  · Stay away from other members of your household. Let healthy household members care for children and pets, if possible. If you have to care for children or pets, wash your hands often and wear a mask. If possible, stay in your own room, separate from others. Use a different bathroom.  · Make sure that all people in your household wash their hands well and often.  · Cough or sneeze into a tissue or your sleeve or elbow. Do not cough or sneeze into your hand or into the air.  · Wear a cloth face covering or face mask.  Where to find more information  · Centers for Disease Control and Prevention: www.cdc.gov/coronavirus/2019-ncov/index.html  · World Health Organization: www.who.int/health-topics/coronavirus  Contact a health care provider if:  · You live in or have traveled to an area where COVID-19 is a risk and you have symptoms of the infection.  · You have had contact with someone who has COVID-19 and you have symptoms of the infection.  Get help right away if:  · You have trouble breathing.  · You have pain or pressure in your chest.  · You have confusion.  · You have bluish lips and fingernails.  · You have difficulty waking from sleep.  · You have symptoms that get worse.  These symptoms may represent a serious problem that is an emergency. Do not wait to see if the symptoms will go away. Get medical help right away. Call your local emergency services (911 in the [place] Do not drive yourself to the hospital. Let the emergency medical personnel know if you think you have COVID-19.  Summary  · COVID-19 is a respiratory infection that is caused by a virus. It is  also known as coronavirus disease or novel coronavirus. It can cause serious infections, such as pneumonia, acute respiratory distress syndrome, acute respiratory failure, or sepsis.  · The virus that causes COVID-19 is contagious. This means that it can spread from person to person through droplets from coughs and sneezes.  · You are more likely to develop a serious illness if you are 65 years of age or older, have a weak immunity, live in a nursing home, or have chronic disease.  · There is no medicine to treat COVID-19. Your health care provider will talk with you about ways to treat your symptoms.  · Take steps to protect yourself and others from infection. Wash your hands often and disinfect objects and surfaces that are frequently touched every day. Stay away from people who are sick and wear a mask if you are sick.  This information is not intended to replace advice given to you by your health care provider. Make sure you discuss any questions you have with your health care provider.  Document Released: 01/23/2020 Document Revised: 05/14/2020 Document Reviewed: 01/23/2020  [employer] Patient Education © 2020 [employer]

## 2022-02-18 NOTE — PROGRESS NOTES
Subjective:     Ariela Magallanes Soto is a 21 y.o. female who presents for Fever (SOB, coughing x 5 days)      Started on Sunday. Had COVID 1 month ago, symptoms had resolved. Had a negative COVID test on Tuesday, [facility]. SOB possibly triggered by anxiety. SOB x 2 days, worse today. Brother's family were sick, believes they tested positive for influenza.     Fever   This is a new problem. The current episode started in the past 7 days. Associated symptoms include congestion, coughing, nausea, a sore throat and wheezing. Pertinent negatives include no diarrhea or vomiting.   Cough  The current episode started in the past 7 days. Associated symptoms include chills, a fever, a sore throat, shortness of breath and wheezing. Pertinent negatives include no hemoptysis. Her past medical history is significant for environmental allergies.       History reviewed. No pertinent past medical history.    History reviewed. No pertinent surgical history.    Social History     Socioeconomic History   • Marital status: Single     Spouse name: Not on file   • Number of children: Not on file   • Years of education: Not on file   • Highest education level: Not on file   Occupational History   • Not on file   Tobacco Use   • Smoking status: Never Smoker   • Smokeless tobacco: Never Used   Vaping Use   • Vaping Use: Every day   • Substances: Nicotine, Flavoring   • Devices: Disposable, Pre-filled or refillable cartridge, Pre-filled pod   Substance and Sexual Activity   • Alcohol use: Yes     Comment: occ   • Drug use: Not Currently   • Sexual activity: Not on file   Other Topics Concern   • Not on file   Social History Narrative   • Not on file     Social Determinants of Health     Financial Resource Strain: Not on file   Food Insecurity: Not on file   Transportation Needs: Not on file   Physical Activity: Not on file   Stress: Not on file   Social Connections: Not on file   Intimate Partner Violence: Not on file   Housing Stability: Not  "on file        History reviewed. No pertinent family history.     No Known Allergies    Review of Systems   Constitutional: Positive for chills, fever and malaise/fatigue.   HENT: Positive for congestion and sore throat.    Respiratory: Positive for cough, sputum production, shortness of breath and wheezing. Negative for hemoptysis.    Cardiovascular: Negative for leg swelling.   Gastrointestinal: Positive for nausea. Negative for diarrhea and vomiting.   Endo/Heme/Allergies: Positive for environmental allergies.   All other systems reviewed and are negative.       Objective:   /78   Pulse 92   Temp 37.8 °C (100 °F)   Resp 16   Ht 1.575 m (5' 2\")   Wt 65.8 kg (145 lb)   SpO2 96%   BMI 26.52 kg/m²     Physical Exam  Vitals reviewed.   Constitutional:       General: She is not in acute distress.     Appearance: She is well-developed. She is ill-appearing. She is not toxic-appearing.   HENT:      Head: Normocephalic and atraumatic.      Right Ear: External ear normal. Tympanic membrane is not erythematous.      Left Ear: External ear normal. Tympanic membrane is not erythematous.      Nose: Congestion present.      Mouth/Throat:      Lips: Pink.      Mouth: Mucous membranes are moist.      Pharynx: Posterior oropharyngeal erythema present.   Eyes:      Conjunctiva/sclera: Conjunctivae normal.   Cardiovascular:      Rate and Rhythm: Normal rate.   Pulmonary:      Effort: Pulmonary effort is normal. No respiratory distress.      Breath sounds: Normal breath sounds. No stridor. No wheezing, rhonchi or rales.   Musculoskeletal:         General: Normal range of motion.      Cervical back: Normal range of motion and neck supple.   Skin:     General: Skin is warm and dry.      Findings: No rash.   Neurological:      General: No focal deficit present.      Mental Status: She is alert and oriented to person, place, and time.      GCS: GCS eye subscore is 4. GCS verbal subscore is 5. GCS motor subscore is 6. "   Psychiatric:         Mood and Affect: Mood normal.         Speech: Speech normal.         Behavior: Behavior normal.         Thought Content: Thought content normal.         Judgment: Judgment normal.         Assessment/Plan:   1. Viral respiratory infection  - albuterol 108 (90 Base) MCG/ACT Aero Soln inhalation aerosol; Inhale 1-2 Puffs every 6 hours as needed for Shortness of Breath.  Dispense: 8.5 g; Refill: 0    2. SOB (shortness of breath)  - DX-CHEST-2 VIEWS; Future  - POCT Influenza A/B  - albuterol 108 (90 Base) MCG/ACT Aero Soln inhalation aerosol; Inhale 1-2 Puffs every 6 hours as needed for Shortness of Breath.  Dispense: 8.5 g; Refill: 0  Results for orders placed or performed in visit on 02/17/22   POCT Influenza A/B   Result Value Ref Range    Rapid Influenza A-B Negattive     Internal Control Positive Valid     Internal Control Negative Valid      DX-CHEST-2 VIEWS    Result Date: 2/17/2022 2/17/2022 6:24 PM HISTORY/REASON FOR EXAM: Shortness of breath. TECHNIQUE/EXAM DESCRIPTION AND NUMBER OF VIEWS: Two views of the chest. COMPARISON: None. FINDINGS: There is no evidence of focal consolidation or evidence of pulmonary edema. The heart is normal in size. There is no evidence of pleural effusion. Soft tissues and bony structures are unremarkable.     No evidence of acute cardiopulmonary process.    Symptomatic care.  -Oral hydration and rest.   -Cough control: nonpharmacologic options for cough relief such as throat lozenges, hot tea, honey.  -Over the counter expectorant as directed; Guaifenesin (Mucinex).  -Tylenol or ibuprofen for pain and fever as directed.     Seek emergency medical care immediately for: Trouble breathing, persistent pain or pressure in the chest, confusion, inability to wake or stay awake, bluish lips or face, persistent tachycardia (fast heart rate), prolonged dizziness, persistent high grade fevers. Follow up for prolonged cough, persistent wheezing, leg swelling, or any  other concerns. Follow up with your Primary Care Provider.     Discussed viral etiology, imagining with SOB & fever, with recently having COVID. S&S of PNA with follow up. Stable Vitals. Scheduled to work the next 2 days, then returns to work Tuesday. Had had a negative COVID test.     Differential diagnosis, natural history, supportive care, and indications for immediate follow-up discussed.

## 2022-03-16 ENCOUNTER — OFFICE VISIT (OUTPATIENT)
Dept: URGENT CARE | Facility: PHYSICIAN GROUP | Age: 22
End: 2022-03-16
Payer: COMMERCIAL

## 2022-03-16 VITALS
HEIGHT: 62 IN | BODY MASS INDEX: 28.52 KG/M2 | TEMPERATURE: 100.4 F | HEART RATE: 89 BPM | WEIGHT: 155 LBS | SYSTOLIC BLOOD PRESSURE: 102 MMHG | RESPIRATION RATE: 18 BRPM | DIASTOLIC BLOOD PRESSURE: 68 MMHG | OXYGEN SATURATION: 98 %

## 2022-03-16 DIAGNOSIS — S61.511A LACERATION OF RIGHT WRIST, INITIAL ENCOUNTER: ICD-10-CM

## 2022-03-16 DIAGNOSIS — S29.019A STRAIN OF THORACIC REGION, INITIAL ENCOUNTER: ICD-10-CM
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
## 2022-03-16 DIAGNOSIS — S80.11XA HEMATOMA OF RIGHT LOWER LEG: ICD-10-CM

## 2022-03-16 DIAGNOSIS — S80.02XA TRAUMATIC HEMATOMA OF KNEE, LEFT, INITIAL ENCOUNTER: ICD-10-CM

## 2022-03-16 PROCEDURE — 90715 TDAP VACCINE 7 YRS/> IM: CPT | Performed by: EMERGENCY MEDICINE

## 2022-03-16 PROCEDURE — 90471 IMMUNIZATION ADMIN: CPT | Performed by: EMERGENCY MEDICINE

## 2022-03-16 PROCEDURE — 99213 OFFICE O/P EST LOW 20 MIN: CPT | Mod: 25 | Performed by: EMERGENCY MEDICINE

## 2022-03-16 ASSESSMENT — FIBROSIS 4 INDEX: FIB4 SCORE: 0.44

## 2022-03-16 ASSESSMENT — ENCOUNTER SYMPTOMS
FOCAL WEAKNESS: 0
SHORTNESS OF BREATH: 0
HEADACHES: 1
BACK PAIN: 1
LOSS OF CONSCIOUSNESS: 0

## 2022-03-16 NOTE — LETTER
[facility] [place] [id number] [facility] [id number]     March 16, 2022    Patient: Ariela Magallanes Soto   YOB: 2000   Date of Visit: 3/16/2022       To Whom It May Concern:    Ariela Magallanes Soto was seen and treated in our department on 3/16/2022.   Please excuse her from work for medical reasons through 3/19/2022.  She may return to full duty on 3/20/22 without restrictions.    Sincerely,     Xiomara Lau M.D.
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
## 2022-03-17 NOTE — PATIENT INSTRUCTIONS
You may take acetaminophen, ibuprofen or naproxen as directed for pain.  You may alternate 650-1000 mg (2 tabs) acetaminophen with 600 mg (3 tabs) ibuprofen if needed for pain.        Contusion  A contusion is a deep bruise. Contusions are the result of a blunt injury to tissues and muscle fibers under the skin. The injury causes bleeding under the skin. The skin overlying the contusion may turn blue, purple, or yellow. Minor injuries will give you a painless contusion, but more severe injuries cause contusions that may stay painful and swollen for a few weeks.  Follow these instructions at home:  Pay attention to any changes in your symptoms. Let your health care provider know about them. Take these actions to relieve your pain.  Managing pain, stiffness, and swelling    · Use resting, icing, applying pressure (compression), and raising (elevating) the injured area. This is often called the RICE strategy.  ? Rest the injured area. Return to your normal activities as told by your health care provider. Ask your health care provider what activities are safe for you.  ? If directed, put ice on the injured area:  § Put ice in a plastic bag.  § Place a towel between your skin and the bag.  § Leave the ice on for 20 minutes, 2-3 times per day.  ? If directed, apply light compression to the injured area using an elastic bandage. Make sure the bandage is not wrapped too tightly. Remove and reapply the bandage as directed by your health care provider.  ? If possible, raise (elevate) the injured area above the level of your heart while you are sitting or lying down.  General instructions  · Take over-the-counter and prescription medicines only as told by your health care provider.  · Keep all follow-up visits as told by your health care provider. This is important.  Contact a health care provider if:  · Your symptoms do not improve after several days of treatment.  · Your symptoms get worse.  · You have difficulty moving the  injured area.  Get help right away if:  · You have severe pain.  · You have numbness in a hand or foot.  · Your hand or foot turns pale or cold.  Summary  · A contusion is a deep bruise.  · Contusions are the result of a blunt injury to tissues and muscle fibers under the skin.  · It is treated with rest, ice, compression, and elevation. You may be given over-the-counter medicines for pain.  · Contact a health care provider if your symptoms do not improve, or get worse.  · Get help right away if you have severe pain, have numbness, or the area turns pale or cold.  This information is not intended to replace advice given to you by your health care provider. Make sure you discuss any questions you have with your health care provider.  Document Released: 09/27/2006 Document Revised: 08/08/2019 Document Reviewed: 08/08/2019  Elsevier Patient Education © 2020 [employer]    Muscle Strain  A muscle strain is an injury that occurs when a muscle is stretched beyond its normal length. Usually, a small number of muscle fibers are torn when this happens. There are three types of muscle strains. First-degree strains have the least amount of muscle fiber tearing and the least amount of pain. Second-degree and third-degree strains have more tearing and pain.  Usually, recovery from muscle strain takes 1-2 weeks. Complete healing normally takes 5-6 weeks.  What are the causes?  This condition is caused when a sudden, violent force is placed on a muscle and stretches it too far. This may occur with a fall, lifting, or sports.  What increases the risk?  This condition is more likely to develop in athletes and people who are physically active.  What are the signs or symptoms?  Symptoms of this condition include:  · Pain.  · Bruising.  · Swelling.  · Trouble using the muscle.  How is this diagnosed?  This condition is diagnosed based on a physical exam and your medical history. Tests may also be done, including an X-ray, ultrasound,  or MRI.  How is this treated?  This condition is initially treated with PRICE therapy. This therapy involves:  · Protecting the muscle from being injured again.  · Resting the injured muscle.  · Icing the injured muscle.  · Applying pressure (compression) to the injured muscle. This may be done with a splint or elastic bandage.  · Raising (elevating) the injured muscle.  Your health care provider may also recommend medicine for pain.  Follow these instructions at home:  If you have a splint:  · Wear the splint as told by your health care provider. Remove it only as told by your health care provider.  · Loosen the splint if your fingers or toes tingle, become numb, or turn cold and blue.  · Keep the splint clean.  · If the splint is not waterproof:  ? Do not let it get wet.  ? Cover it with a watertight covering when you take a bath or a shower.  Managing pain, stiffness, and swelling    · If directed, put ice on the injured area.  ? If you have a removable splint, remove it as told by your health care provider.  ? Put ice in a plastic bag.  ? Place a towel between your skin and the bag.  ? Leave the ice on for 20 minutes, 2-3 times a day.  · Move your fingers or toes often to avoid stiffness and to lessen swelling.  · Raise (elevate) the injured area above the level of your heart while you are sitting or lying down.  · Wear an elastic bandage as told by your health care provider. Make sure that it is not too tight.  General instructions  · Take over-the-counter and prescription medicines only as told by your health care provider.  · Restrict your activity and rest the injured muscle as told by your health care provider. Gentle movements may be allowed.  · If physical therapy was prescribed, do exercises as told by your health care provider.  · Do not put pressure on any part of the splint until it is fully hardened. This may take several hours.  · Do not use any products that contain nicotine or tobacco, such as  cigarettes and e-cigarettes. These can delay bone healing. If you need help quitting, ask your health care provider.  · Ask your health care provider when it is safe to drive if you have a splint.  · Keep all follow-up visits as told by your health care provider. This is important.  How is this prevented?  · Warm up before exercising. This helps to prevent future muscle strains.  Contact a health care provider if:  · You have more pain or swelling in the injured area.  Get help right away if:  · You have numbness or tingling or lose a lot of strength in the injured area.  Summary  · A muscle strain is an injury that occurs when a muscle is stretched beyond its normal length.  · This condition is caused when a sudden, violent force is placed on a muscle and stretches it too far.  · This condition is initially treated with PRICE therapy, which involves protecting, resting, icing, compressing, and elevating.  · Gentle movements may be allowed. If physical therapy was prescribed, do exercises as told by your health care provider.  This information is not intended to replace advice given to you by your health care provider. Make sure you discuss any questions you have with your health care provider.  Document Released: 12/18/2006 Document Revised: 11/30/2018 Document Reviewed: 01/24/2018  Elsevier Patient Education © 2020 [employer]

## 2022-03-21 ENCOUNTER — OFFICE VISIT (OUTPATIENT)
Dept: URGENT CARE | Facility: PHYSICIAN GROUP | Age: 22
End: 2022-03-21
Payer: COMMERCIAL

## 2022-03-21 VITALS
WEIGHT: 155 LBS | OXYGEN SATURATION: 99 % | HEART RATE: 80 BPM | DIASTOLIC BLOOD PRESSURE: 70 MMHG | SYSTOLIC BLOOD PRESSURE: 112 MMHG | BODY MASS INDEX: 28.52 KG/M2 | HEIGHT: 62 IN | RESPIRATION RATE: 16 BRPM | TEMPERATURE: 98.6 F
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
## 2022-03-21 DIAGNOSIS — R53.83 FATIGUE, UNSPECIFIED TYPE: ICD-10-CM

## 2022-03-21 PROCEDURE — 99213 OFFICE O/P EST LOW 20 MIN: CPT | Performed by: PHYSICIAN ASSISTANT

## 2022-03-21 RX ORDER — METHOCARBAMOL 500 MG/1
500 TABLET, FILM COATED ORAL 4 TIMES DAILY
COMMUNITY
End: 2023-03-16

## 2022-03-21 ASSESSMENT — ENCOUNTER SYMPTOMS
BLURRED VISION: 0
MYALGIAS: 1
BACK PAIN: 1
ABDOMINAL PAIN: 0
DOUBLE VISION: 0
SHORTNESS OF BREATH: 0
WEAKNESS: 0
DIZZINESS: 0
HEADACHES: 1
FEVER: 0
NAUSEA: 0
LOSS OF CONSCIOUSNESS: 0
NECK PAIN: 1
CHILLS: 0
COUGH: 0
TINGLING: 0
PALPITATIONS: 0

## 2022-03-21 ASSESSMENT — FIBROSIS 4 INDEX: FIB4 SCORE: 0.44

## 2022-03-21 ASSESSMENT — PAIN SCALES - GENERAL: PAINLEVEL: 8=MODERATE-SEVERE PAIN
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
## 2022-06-07 ENCOUNTER — HOSPITAL ENCOUNTER (OUTPATIENT)
Dept: RADIOLOGY | Facility: MEDICAL CENTER | Age: 22
End: 2022-06-07
Attending: PHYSICIAN ASSISTANT
Payer: COMMERCIAL

## 2022-06-07 DIAGNOSIS — R05.1 ACUTE COUGH: ICD-10-CM

## 2022-11-29 ENCOUNTER — OFFICE VISIT (OUTPATIENT)
Dept: SLEEP MEDICINE | Facility: MEDICAL CENTER | Age: 22
End: 2022-11-29
Payer: COMMERCIAL

## 2022-11-29 VITALS
HEART RATE: 98 BPM | BODY MASS INDEX: 28.63 KG/M2 | SYSTOLIC BLOOD PRESSURE: 122 MMHG | HEIGHT: 62 IN | WEIGHT: 155.6 LBS | OXYGEN SATURATION: 96 % | RESPIRATION RATE: 16 BRPM | DIASTOLIC BLOOD PRESSURE: 72 MMHG

## 2022-11-29 DIAGNOSIS — Z3A.08 8 WEEKS GESTATION OF PREGNANCY: ICD-10-CM

## 2022-11-29 DIAGNOSIS — G47.19 EXCESSIVE DAYTIME SLEEPINESS: ICD-10-CM

## 2022-11-29 DIAGNOSIS — R06.83 SNORING: ICD-10-CM

## 2022-11-29 DIAGNOSIS — R06.81 WITNESSED EPISODE OF APNEA: ICD-10-CM

## 2022-11-29 DIAGNOSIS — G47.30 BREATHING-RELATED SLEEP DISORDER: ICD-10-CM

## 2022-11-29 PROCEDURE — 99204 OFFICE O/P NEW MOD 45 MIN: CPT | Performed by: PREVENTIVE MEDICINE

## 2022-11-29 ASSESSMENT — FIBROSIS 4 INDEX: FIB4 SCORE: 0.46

## 2022-11-29 ASSESSMENT — ENCOUNTER SYMPTOMS: SLEEP DISTURBANCE: 1

## 2022-11-29 ASSESSMENT — PATIENT HEALTH QUESTIONNAIRE - PHQ9: CLINICAL INTERPRETATION OF PHQ2 SCORE: 0

## 2022-11-29 NOTE — PROGRESS NOTES
"CHIEF COMPLIANT: \"I guess I need a sleep study.\"  HISTORY OF PRESENT ILLNESS:  Ariela Magallanes Soto is a 22 y.o. female kindly referred by CARLITOS Deutsch and  is here for a sleep medicine consultation.   She is 8 weeks pregnant.  Pt is stopping all meds  except Lamotrigine (being titrated off of that medication).     Sleep History: She has never had a sleep test. She wakes up gasping for air and heart pounding. She goes to bed bw 9 and 10 and falls asleep in minutes. She does wake up at least 2x a night. She does nap at least 3 days a week at least for 1 hour. She does not  currently use tobacco, alcohol, or cannabis and has stopped drinking caffeinated beverages. She is a former e cigarette smoker for 4 years. She has used trazodone in the past for sleep. She is currently working with psychiatrist on her medication management.       EPWORTH SCORE:     11 /24, this is consistent with EDS      Significant comorbidities and modifying factors: see below    PAST MEDICAL HISTORY:  Past Medical History:   Diagnosis Date    Abdominal pain     Apnea, sleep     Asthma     Chickenpox     Constipation     Daytime sleepiness     Depression     Difficulty breathing     Frequent headaches     Frequent urination     Insomnia     Painful joint     Ringing in ears     Snoring     Vision loss     Wears glasses       PROBLEM LIST:  There are no problems to display for this patient.    PAST SOCIAL HISTORY:  History reviewed. No pertinent surgical history.  PAST FAMILY HISTORY:  History reviewed. No pertinent family history.  SOCIAL HISTORY:  Social History     Socioeconomic History    Marital status: Single     Spouse name: Not on file    Number of children: Not on file    Years of education: Not on file    Highest education level: Not on file   Occupational History    Not on file   Tobacco Use    Smoking status: Former     Packs/day: 0.00     Years: 4.00     Pack years: 0.00     Types: Cigarettes, Electronic Cigarettes     " Start date: 2018     Quit date: 10/25/2022     Years since quittin.1    Smokeless tobacco: Never    Tobacco comments:     Would only use e-cigs. Started switching off to cigarettes at the beginning of .   Vaping Use    Vaping Use: Former    Substances: Nicotine, Flavoring    Devices: Disposable, Pre-filled or refillable cartridge, Pre-filled pod   Substance and Sexual Activity    Alcohol use: Not Currently     Alcohol/week: 4.8 oz     Types: 4 Cans of beer, 4 Shots of liquor per week     Comment: This was between 1-3 times a week    Drug use: Not Currently     Frequency: 1.0 times per week     Types: Marijuana, Cocaine     Comment: Cocaine mainly in . Marijuana occasion [id number]    Sexual activity: Not on file   Other Topics Concern    Not on file   Social History Narrative    Not on file     Social Determinants of Health     Financial Resource Strain: Not on file   Food Insecurity: Not on file   Transportation Needs: Not on file   Physical Activity: Not on file   Stress: Not on file   Social Connections: Not on file   Intimate Partner Violence: Not on file   Housing Stability: Not on file     ALLERGIES: Patient has no known allergies.  MEDICATIONS:  Current Outpatient Medications   Medication Sig Dispense Refill    albuterol 108 (90 Base) MCG/ACT Aero Soln inhalation aerosol Inhale 1-2 Puffs every 6 hours as needed for Shortness of Breath. 8.5 g 0    methocarbamol (ROBAXIN) 500 MG Tab Take 500 mg by mouth 4 times a day.      lamoTRIgine (LAMICTAL) 100 MG Tab Take 100 mg by mouth every day. (Patient not taking: Reported on 2022)      escitalopram (LEXAPRO) 20 MG tablet Take 20 mg by mouth every day. (Patient not taking: Reported on 2022)      traZODone (DESYREL) 50 MG Tab Take 50 mg by mouth every evening. (Patient not taking: Reported on 2022)      lamoTRIgine (LAMICTAL) 25 MG Tab       naproxen (NAPROSYN) 500 MG Tab  (Patient not taking: Reported on 2022)      citalopram  "(CELEXA) 20 MG Tab Take 20 mg by mouth every day.      famotidine (PEPCID) 20 MG Tab Take 20 mg by mouth 2 times a day.      omeprazole (PRILOSEC) 40 MG delayed-release capsule Take 1 capsule by mouth every day. (Patient not taking: Reported on 11/29/2022) 30 capsule 0    ibuprofen (MOTRIN) 200 MG Tab Take 200 mg by mouth every 6 hours as needed.       No current facility-administered medications for this visit.    \"CURRENT RX\"    REVIEW OF SYSTEMS:  Constitutional: Denies weight loss, endorses chronic daytime fatigue++  Eyes: Denies vision changes  Ears/Nose/Mouth/Throat: Denies rhinitis/nasal congestion, injury, decayed teeth/toothaches.  Cardiovascular: Denies chest pain, tightness, palpitations, swelling in legs/feet, difficulty breathing when lying down but gets better when sitting up.   Respiratory: Denies shortness of breath while awake,  Sleep: per HPI  Gastrointestinal: Denies  difficulty swallowing,  heartburn.  Genitourinary: REPORTS nocturia  Musculoskeletal: Denies painful joints, sore muscles, back pain.   Neurological: endorses headaches,denies weakness, dizziness.    PHYSICAL EXAM/VITALS:  /72 (BP Location: Left arm, Patient Position: Sitting, BP Cuff Size: Adult)   Pulse 98   Resp 16   Ht 1.575 m (5' 2\")   Wt 70.6 kg (155 lb 9.6 oz)   SpO2 96%   BMI 28.46 kg/m²   Neck circumference (inches): 14 in  Appearance: Well-nourished, well-developed,  looks stated age, no acute distress  Eyes:   EOMI  ENMT: MASKED Mallampati:3    Neck: Supple, trachea midline  Respiratory effort:  No intercostal retractions or use of accessory muscles  Lung auscultation:  No wheezes rhonchi rubs or rales  Cardiac: No murmurs, rubs, or gallops; regular rhythm, normal rate; no edema  Musculoskeletal:  Grossly normal; gait and station normal  Neurologic:  oriented to person, time, place, and purpose; judgement intact  Psychiatric:  No depression, anxiety, agitation    MEDICAL DECISION MAKING:  The medical record was " reviewed as it pertains to this referral. This includes records from primary care,consultants notes, referral request, hospital records, labs and imaging. Any available diagnostic and titration nocturnal polysomnograms, home sleep apnea tests, continuous nocturnal oximetry results, multiple sleep latency tests, and recent compliance reports were reviewed with the patient.    ASSESSMENT/PLAN:  Ariela Magallanes Soto is a 22 y.o. female who has a high pretest probability of having obstructive sleep apnea.      HSTs often underestimate sleep apnea in women. A negative HST should be  followed by a laboratory sleep study (polysomnography) if the clinical suspicion for  sleep apnea is high. In women, the common co-occurrence of insomnia and    1. Breathing-related sleep disorder  - Overnight Home Sleep Study; Future    2. Snoring  - Overnight Home Sleep Study; Future    3. Witnessed episode of apnea  - Overnight Home Sleep Study; Future    4. Excessive daytime sleepiness  - Overnight Home Sleep Study; Future    5. 8 weeks gestation of pregnancy  - Overnight Home Sleep Study; Future    The patient has signs and symptoms consistent with obstructive sleep apnea hypopnea syndrome. Will schedule a nocturnal polysomnogram or a home sleep apnea test (please see plan).  The risks of untreated sleep apnea were discussed with the patient at length. Patients with BRIANDA are at increased risk of cardiovascular disease including coronary artery disease, systemic arterial hypertension, pulmonary arterial hypertension, cardiac arrhythmias, and stroke. BRIANDA patients have an increased risk of motor vehicle accidents, type 2 diabetes, chronic kidney disease, and non-alcoholic liver disease. The patient was advised to avoid driving a motor vehicle when drowsy.  Have advised the patient to follow up with the appropriate healthcare practitioners for all other medical problems and issues.    RETURN TO CLINIC: Return for 3 weeks after ss to discuss  results -DR. Farias.    My total time spent caring for the patient on the day of the encounter was 40 minutes. This includes time spent on a thorough chart review including other physician notes, all sleep studies, as well as critical labs and pulmonary and cardiac studies.  Additionally, it includes a thorough discussion of good sleep hygiene and stimulus control, as well as  the need for consistency in terms of sleep preparation and practice.    Please note that this dictation was created using voice recognition software.  I have made every reasonable attempt to correct obvious errors, I expect that there are errors of grammar and possibly content that I did not discover before finalizing this note.                      Answers submitted by the patient for this visit:  Sleep Center Questionnaire (Submitted on 11/29/2022)  Year of your last physical exam: 2022  Occupation : N/A  Height: 5’2  Current weight: 159lbs  6 months ago: 165lbs  At age 18: 125lbs  What is the reason for your visit today?: Sleep Study  Name of person referring you to the Sleep Center: Dr.DeaAnne Zurita  Have you ever been hospitalized?: Yes  Reason, year, and hospital in which you were hospitalized:: Depression. [facility]  Have you ever had problems with anesthesia?: No  Have you experienced post-operative delirium?: No  Any complications with surgery?: No  What year did you receive your last Flu shot?: 2021  What year did you receive you last Pneumonia shot?: Uknown  Have you had a TB skin test? If so, please list the year and result:: No  Have you had Allergy skin testing? If so, please list the year and result:: No  Please briefly describe your sleep problem and how old you were when it began.: At approximately 16. [place] been told to sound like I have a hard time breathing when I sleep. Extreme snoring and occasionally sound like I stop.  How does this affect your daily life and activities? Please also rate how serious of a problem  this is (1 = Not at all, 10 = Very Serious).: 6  Have you had any previous evaluations, examinations, or treatment for this sleep problem or any other problems with your sleep? If so, please describe the evaluation, treatment, and results.: No  Have you used any medications (prescribed or otherwise) to help your sleep problem? If yes, include name, amount, frequency, and the prescribing physician.: No  If employed, what time do you usually start and end work?: Unemployed  Do you ever change work shifts? If yes, describe how often (never, infrequently, regularly).: When previously employed I usually stuck to my designated shift.  What time do you usually go to bed and wake up on: Weekdays? Weekends?: Weekdays: 9pm-7am Weekends:11pm- 9am  Do you have a regular bed partner?: No  How many minutes does it usually take to fall asleep at night after turning off the lights?: 20  What do you ordinarily do just prior to turning out the lights and attempting to go to sleep (e.g., reading, TV, baths, etc.)?: TV, phone, drawing  On average, how many times do you wake up during the night?: 2 times  On average, how many times do you wake up to use the bathroom?: 1 time  Do you often wake up too early in the morning and are unable to return to sleep?: Rarely  On average, how many hours of sleep do you get per night?: Between 7hours-9hours  How do you usually awaken?  Alarm, spontaneously, or other?: Occasionally by alarm or Spontaneously  Is it difficult for you to awaken and get out of bed after sleeping? (Not at all, Sometimes, Very): Sometimes  Do you nap or return to bed after arising?: Nap  Are you bothered by sleepiness during the day?: Yes  Do you feel that you get too much sleep at night?: No  Do you feel that you get too little sleep at night?: Occasionally  Do you usually feel tired during the day? If so, what do you attribute this to?: Currently due to pregnancy, but prior I would consistently be tired theough out the  day which would severely affect work because I would enter at 4:30am  Do you find yourself falling asleep when you don't mean to? : Yes  If yes, how long does your sleep episode last?: 3 minutes  Do you feel rested or refreshed after the sleep episode?: No  Have you ever suddenly fallen?: Yes  Have you ever experienced sudden body weakness?: Yes  If yes, were you aware of the things around you?: Yes  Was the weakness brought on by any particular event or feeling? If so, briefly describe.: When I would have a sleep episode, I would fall over and lose balance.  Have you ever experienced weakness or paralysis upon going to sleep?: No  Have you ever experienced weakness or paralysis upon awakening from sleep?: Occasionally  If yes for either of the above two questions, please indicate how many times per week does this occur?: It use to happen at least once a weak. Now Approximately twice a month  Have you ever experienced seeing things or hearing voices/noises: That weren't real? On going to sleep? During the night? On awakening from sleep? During the day?: Yes, when extremely tired I tend to see/hear things.  Do you have difficulty breathing at night? If yes, briefly describe.: On occasion it’ll feel like an anxiety attack when i wake up from my sleep. Im unsure if it’s due to breathing issues.  How many times per week?: 2  How did you become aware of this, at what age did this first occur, and how many years has this occurred?: This first started occurring a couple months ago. Approximately the beginning of the year.  Have you been told you snore while asleep? If so, does it disturb a bed partner (or someone in the same room), or someone in the next room?: Yes. Sleeping partners and anyone around the house regardless of my foor being closed.  Have you ever experienced doing something without being aware of the action? If yes, please describe.: When I was in elementary I was a consistent sleep walker. I’ve been told  "that I will randomly crack my knuckles. Shift to an opposite side of the bed, hand movements and occasional talking.  How many times per week does this occur?: 1  Have you ever experienced upon lying in bed before sleep or on awakening from sleep: Restlessness of legs, \"nervous legs\", \"creeping crawling\" sensation of legs, or twitching of legs?: Usually in the middle of my sleep I will wake up with restless legs and I feel it in my arms as well.  How many times per week does this occur, and how many minutes does the sensation last?: 3 times a week. Anywhere between 15-30minutes  Does anything relieve the sensations (e.g., getting out of bed, medication, massage)?: No  At what age did this first occur, and how many years has this occurred?: 18. It has been 4 years. It started in a rare occasion (probably a 3 times a month) now it happens every other night since the beginning of the year.  Have you ever been told that your arms or legs jerk or twitch while you are asleep? If yes, how many times per night does this occur?: Yes, 1-3 times  At what age did this first occur, and how many years has this occurred?: Since i was approximately 12  Does this seem to awaken you from your sleep?: Yes  Do you know, or have you ever been told that you do any of the following while sleeping: talk, walk, grit teeth, wet the bed, wake up screaming or seemingly afraid, have disturbing dreams, have unusual movements, wake up with headaches, (males) have erections? If yes to any of these, please indicate how many times per week, age started, last occurrence, treatment received.: Talk (7y.o., no medications recieved), grit teeth(since I was 16, was given a overnight mouth gaurd by a dentist), afraid on occasion/disturbing dreams are pretty common(8y.o., No rx prescribed), unusual movements (7y.o., no RXrecieved)  Has anyone in your family been known to have any sleep problems? If yes, please list the type of problem (e.g., trouble " getting to sleep, too sleepy, bed wetting, etc.), the relationship of this person to you, and the treatment received.: Older brother would wake up distured/screaming/hallucinations at the age of 13-15) no tratment that i know of. Mom has difficulty staying asleep, due to depression and stress. She now takes anti-depressants.

## 2022-12-19 ENCOUNTER — HOSPITAL ENCOUNTER (OUTPATIENT)
Dept: LAB | Facility: MEDICAL CENTER | Age: 22
End: 2022-12-19
Attending: OBSTETRICS & GYNECOLOGY
Payer: COMMERCIAL

## 2022-12-19 ENCOUNTER — HOSPITAL ENCOUNTER (OUTPATIENT)
Dept: LAB | Facility: MEDICAL CENTER | Age: 22
End: 2022-12-19
Attending: OBSTETRICS & GYNECOLOGY

## 2022-12-19 LAB
ABO GROUP BLD: NORMAL
BASOPHILS # BLD AUTO: 0.7 % (ref 0–1.8)
BASOPHILS # BLD: 0.07 K/UL (ref 0–0.12)
BLD GP AB SCN SERPL QL: NORMAL
EOSINOPHIL # BLD AUTO: 0.4 K/UL (ref 0–0.51)
EOSINOPHIL NFR BLD: 4 % (ref 0–6.9)
ERYTHROCYTE [DISTWIDTH] IN BLOOD BY AUTOMATED COUNT: 40.6 FL (ref 35.9–50)
HBV SURFACE AG SER QL: NORMAL
HCT VFR BLD AUTO: 40.6 % (ref 37–47)
HCV AB SER QL: NORMAL
HGB BLD-MCNC: 13.5 G/DL (ref 12–16)
HIV 1+2 AB+HIV1 P24 AG SERPL QL IA: NORMAL
IMM GRANULOCYTES # BLD AUTO: 0.04 K/UL (ref 0–0.11)
IMM GRANULOCYTES NFR BLD AUTO: 0.4 % (ref 0–0.9)
LYMPHOCYTES # BLD AUTO: 1.44 K/UL (ref 1–4.8)
LYMPHOCYTES NFR BLD: 14.5 % (ref 22–41)
MCH RBC QN AUTO: 30.7 PG (ref 27–33)
MCHC RBC AUTO-ENTMCNC: 33.3 G/DL (ref 33.6–35)
MCV RBC AUTO: 92.3 FL (ref 81.4–97.8)
MONOCYTES # BLD AUTO: 0.49 K/UL (ref 0–0.85)
MONOCYTES NFR BLD AUTO: 4.9 % (ref 0–13.4)
NEUTROPHILS # BLD AUTO: 7.52 K/UL (ref 2–7.15)
NEUTROPHILS NFR BLD: 75.5 % (ref 44–72)
NRBC # BLD AUTO: 0 K/UL
NRBC BLD-RTO: 0 /100 WBC
PLATELET # BLD AUTO: 295 K/UL (ref 164–446)
PMV BLD AUTO: 10.8 FL (ref 9–12.9)
RBC # BLD AUTO: 4.4 M/UL (ref 4.2–5.4)
RH BLD: NORMAL
RUBV AB SER QL: 58 IU/ML
T PALLIDUM AB SER QL IA: NORMAL
WBC # BLD AUTO: 10 K/UL (ref 4.8–10.8)

## 2022-12-19 PROCEDURE — 86803 HEPATITIS C AB TEST: CPT

## 2022-12-19 PROCEDURE — 86850 RBC ANTIBODY SCREEN: CPT

## 2022-12-19 PROCEDURE — 36415 COLL VENOUS BLD VENIPUNCTURE: CPT

## 2022-12-19 PROCEDURE — 86900 BLOOD TYPING SEROLOGIC ABO: CPT

## 2022-12-19 PROCEDURE — 87389 HIV-1 AG W/HIV-1&-2 AB AG IA: CPT

## 2022-12-19 PROCEDURE — 86901 BLOOD TYPING SEROLOGIC RH(D): CPT

## 2022-12-19 PROCEDURE — 85025 COMPLETE CBC W/AUTO DIFF WBC: CPT

## 2022-12-19 PROCEDURE — 86780 TREPONEMA PALLIDUM: CPT

## 2022-12-19 PROCEDURE — 87624 HPV HI-RISK TYP POOLED RSLT: CPT

## 2022-12-19 PROCEDURE — 87491 CHLMYD TRACH DNA AMP PROBE: CPT

## 2022-12-19 PROCEDURE — 87340 HEPATITIS B SURFACE AG IA: CPT

## 2022-12-19 PROCEDURE — 88175 CYTOPATH C/V AUTO FLUID REDO: CPT

## 2022-12-19 PROCEDURE — 87591 N.GONORRHOEAE DNA AMP PROB: CPT

## 2022-12-19 PROCEDURE — 87086 URINE CULTURE/COLONY COUNT: CPT

## 2022-12-19 PROCEDURE — 86762 RUBELLA ANTIBODY: CPT

## 2022-12-21 LAB
BACTERIA UR CULT: NORMAL
C TRACH DNA GENITAL QL NAA+PROBE: NEGATIVE
CYTOLOGY REG CYTOL: NORMAL
N GONORRHOEA DNA GENITAL QL NAA+PROBE: NEGATIVE
SIGNIFICANT IND 70042: NORMAL
SITE SITE: NORMAL
SOURCE SOURCE: NORMAL
SPECIMEN SOURCE: NORMAL

## 2022-12-23 LAB
HPV HR 12 DNA CVX QL NAA+PROBE: POSITIVE
HPV16 DNA SPEC QL NAA+PROBE: NEGATIVE
HPV18 DNA SPEC QL NAA+PROBE: NEGATIVE
SPECIMEN SOURCE: ABNORMAL

## 2023-01-26 ENCOUNTER — HOME STUDY (OUTPATIENT)
Dept: SLEEP MEDICINE | Facility: MEDICAL CENTER | Age: 23
End: 2023-01-26
Attending: PREVENTIVE MEDICINE
Payer: COMMERCIAL

## 2023-01-26 DIAGNOSIS — G47.30 BREATHING-RELATED SLEEP DISORDER: ICD-10-CM

## 2023-01-26 DIAGNOSIS — R06.81 WITNESSED EPISODE OF APNEA: ICD-10-CM

## 2023-01-26 DIAGNOSIS — R06.83 SNORING: ICD-10-CM

## 2023-01-26 DIAGNOSIS — Z3A.08 8 WEEKS GESTATION OF PREGNANCY: ICD-10-CM

## 2023-01-26 DIAGNOSIS — G47.19 EXCESSIVE DAYTIME SLEEPINESS: ICD-10-CM

## 2023-01-26 PROCEDURE — 95800 SLP STDY UNATTENDED: CPT | Performed by: PREVENTIVE MEDICINE

## 2023-02-06 NOTE — PROCEDURES
DIAGNOSTIC HOME SLEEP TEST (HST) REPORT WatchPAT      PATIENT ID:  NAME:  Ariela Magallanes Soto  MRN:               [id number]  YOB: 2000  DATE OF STUDY: 01/26/23      Impression:     This study shows evidence of:      1.  No significant obstructive sleep apnea with PAT apnea hyponea index(pAHI) of 3.0 per hour.  PAT respiratory disturbance index (pRDI) was 6.5 per hour. These findings are based on 7 channels recording of PAT signal with sleep staging, heart rate, pulse oximetry, actigraphy, body position, snoring and respiratory movement.     2. Oxygenation O2 Sat. mean O2 sat was 94%,  isaac was 89%,  and maximum O2 at 98 %. O2 sat was at or  below 88% for 0 min of evaluation time. Oxygen Desaturation (>=4%) Index was 5/hr. AVG HR was 71 BPM.      TECHNICAL DESCRIPTION: Patient underwent home sleep apnea testing with peripheral arterial tone signal (WatchPAT™). Monitoring was done with 7 channels recording of PAT signal with sleep staging, heart rate, pulse oximetry, actigraphy, body position, snoring and respiratory movement. Prior to using the device, the patient received verbal and written instructions for its application and was provided with the help desk phone number for additional telephonic instruction with 24-hour availability of qualified personnel to answer questions.    Respiratory events:    pRDI: Total 48 (REM index 10.5/hr. NREM index 4.9/hr, All Night 6.5/hr)    3% pAHI: Total 22 (REM index 4.3/hr. NREM index 2.5/hr, All Night 3.0/hr)    3% pAHIc: Total 0.4 ( All Night 0.4/hr)    4% pAHI: Total 6 (All Night 0.8/hr)    General sleep summary: . Total recording time is 9 hrs 36 mins and total Sleep time is 7 hours and 29 minutes. The patient spent 40 minutes in the supine position and 410 minutes in the nonsupine position.      Recommendations:    This patient does not have obstructive sleep apnea nor does she have nocturnal oxygen desaturation. No recommendations at this  time. If this patient is severely symptomatic then an in lab PSG should be done.

## 2023-02-09 ENCOUNTER — TELEPHONE (OUTPATIENT)
Dept: SLEEP MEDICINE | Facility: MEDICAL CENTER | Age: 23
End: 2023-02-09
Payer: COMMERCIAL

## 2023-02-09 NOTE — TELEPHONE ENCOUNTER
SLEEP - RESULTS - CHART PREP COMPLETED ON 02/09/2023    SCHEDULED FOLLOW UP 02/10/2023    Last Office Visit 11/29/2022 with DR. HALL    Sleep Study Complete on  HOME SLEEP STUDY 01/26/2023    OPO Completed on N/A Pressures Completed on N/A    Raw Data in Media? YES  , If raw data is missing has sleep tech been notified? N/A    Interp Completed? YES  , If pending has provider been notified ? N/A

## 2023-02-10 ENCOUNTER — OFFICE VISIT (OUTPATIENT)
Dept: SLEEP MEDICINE | Facility: MEDICAL CENTER | Age: 23
End: 2023-02-10
Payer: COMMERCIAL

## 2023-02-10 VITALS
HEART RATE: 104 BPM | WEIGHT: 147.6 LBS | RESPIRATION RATE: 16 BRPM | DIASTOLIC BLOOD PRESSURE: 70 MMHG | SYSTOLIC BLOOD PRESSURE: 104 MMHG | BODY MASS INDEX: 27.16 KG/M2 | HEIGHT: 62 IN | OXYGEN SATURATION: 96 %

## 2023-02-10 DIAGNOSIS — R06.83 SNORING: ICD-10-CM

## 2023-02-10 PROCEDURE — 99214 OFFICE O/P EST MOD 30 MIN: CPT | Performed by: PREVENTIVE MEDICINE

## 2023-02-10 ASSESSMENT — FIBROSIS 4 INDEX: FIB4 SCORE: 0.49

## 2023-02-10 NOTE — PROGRESS NOTES
"CHIEF COMPLIANT: \"How did I do on my sleep study?\"   LAST SEEN:  Dr. Farias on 22  HISTORY OF PRESENT ILLNESS:  Ariela Magallanes Soto is a 22 y.o.female who is currently pregnant 18 wks who is here for sleep study results.     Sleep study results:  TYPE: HST   DATE:01/26/3  Diagnostic: pAHI 3.0  Diagnostic  Oxygen Jamin: 89% with 0mins at or under 88%     Significant comorbidities and modifying factors: see below     PAST MEDICAL HISTORY:  Past Medical History:   Diagnosis Date    Abdominal pain     Apnea, sleep     Asthma     Chickenpox     Constipation     Daytime sleepiness     Depression     Difficulty breathing     Frequent headaches     Frequent urination     Insomnia     Painful joint     Ringing in ears     Snoring     Vision loss     Wears glasses       PROBLEM LIST:  There are no problems to display for this patient.    PAST SOCIAL HISTORY:  History reviewed. No pertinent surgical history.  PAST FAMILY HISTORY:  History reviewed. No pertinent family history.  SOCIAL HISTORY:  Social History     Socioeconomic History    Marital status: Single     Spouse name: Not on file    Number of children: Not on file    Years of education: Not on file    Highest education level: Not on file   Occupational History    Not on file   Tobacco Use    Smoking status: Former     Packs/day: 0.00     Years: 4.00     Pack years: 0.00     Types: Cigarettes, Electronic Cigarettes     Start date: 2018     Quit date: 10/25/2022     Years since quittin.3    Smokeless tobacco: Never    Tobacco comments:     Would only use e-cigs. Started switching off to cigarettes at the beginning of .   Vaping Use    Vaping Use: Former    Substances: Nicotine, Flavoring    Devices: Disposable, Pre-filled or refillable cartridge, Pre-filled pod   Substance and Sexual Activity    Alcohol use: Not Currently     Alcohol/week: 4.8 oz     Types: 4 Cans of beer, 4 Shots of liquor per week     Comment: This was between 1-3 times a week    " "Drug use: Not Currently     Frequency: 1.0 times per week     Types: Marijuana, Cocaine     Comment: Cocaine mainly in 2021. Marijuana occasion 1121-1435    Sexual activity: Not on file   Other Topics Concern    Not on file   Social History Narrative    Not on file     Social Determinants of Health     Financial Resource Strain: Not on file   Food Insecurity: Not on file   Transportation Needs: Not on file   Physical Activity: Not on file   Stress: Not on file   Social Connections: Not on file   Intimate Partner Violence: Not on file   Housing Stability: Not on file     ALLERGIES: Patient has no known allergies.  MEDICATIONS:  Current Outpatient Medications   Medication Sig Dispense Refill    albuterol 108 (90 Base) MCG/ACT Aero Soln inhalation aerosol Inhale 1-2 Puffs every 6 hours as needed for Shortness of Breath. 8.5 g 0    methocarbamol (ROBAXIN) 500 MG Tab Take 500 mg by mouth 4 times a day.      lamoTRIgine (LAMICTAL) 100 MG Tab Take 100 mg by mouth every day. (Patient not taking: Reported on 11/29/2022)      escitalopram (LEXAPRO) 20 MG tablet Take 20 mg by mouth every day. (Patient not taking: Reported on 11/29/2022)      traZODone (DESYREL) 50 MG Tab Take 50 mg by mouth every evening. (Patient not taking: Reported on 11/29/2022)      lamoTRIgine (LAMICTAL) 25 MG Tab       naproxen (NAPROSYN) 500 MG Tab  (Patient not taking: Reported on 11/29/2022)      citalopram (CELEXA) 20 MG Tab Take 20 mg by mouth every day.      famotidine (PEPCID) 20 MG Tab Take 20 mg by mouth 2 times a day.      omeprazole (PRILOSEC) 40 MG delayed-release capsule Take 1 capsule by mouth every day. (Patient not taking: Reported on 11/29/2022) 30 capsule 0    ibuprofen (MOTRIN) 200 MG Tab Take 200 mg by mouth every 6 hours as needed.       No current facility-administered medications for this visit.    \"CURRENT RX\"    REVIEW OF SYSTEMS:  Constitutional: Denies weight loss, endorses chronic daytime fatigue  Eyes: Denies vision " "changes  Ears/Nose/Mouth/Throat: Denies rhinitis/nasal congestion, injury, decayed teeth/toothaches.  Cardiovascular: Denies chest pain, tightness, palpitations, swelling in legs/feet, difficulty breathing when lying down but gets better when sitting up.   Respiratory: Denies shortness of breath while awake,  Sleep: per HPI  Gastrointestinal: Denies  difficulty swallowing,  heartburn.  Genitourinary: REPORTS nocturia  Musculoskeletal: Denies painful joints, sore muscles, back pain.   Neurological: Denies frequent headaches,weakness, dizziness.    PHYSICAL EXAM/VITALS:  /70 (BP Location: Left arm, Patient Position: Sitting, BP Cuff Size: Adult)   Pulse (!) 104   Resp 16   Ht 1.575 m (5' 2\")   Wt 67 kg (147 lb 9.6 oz)   SpO2 96%   BMI 27.00 kg/m²   Appearance: Well-nourished, well-developed,  looks stated age, no acute distress  Eyes:   EOMI  ENMT: MASKED  Neck: Supple, trachea midline  Respiratory effort:  No intercostal retractions or use of accessory muscles  Musculoskeletal:  Grossly normal; gait and station normal  Neurologic:  oriented to person, time, place, and purpose; judgement intact  Psychiatric:  No depression, anxiety, agitation      MEDICAL DECISION MAKING:  The medical record was reviewed as it pertains to this referral. This includes records from primary care,consultants notes, referral request, hospital records, labs and imaging. Any available diagnostic and titration nocturnal polysomnograms, home sleep apnea tests, continuous nocturnal oximetry results, multiple sleep latency tests, and recent compliance reports were reviewed with the patient.    ASSESSMENT/PLAN:  Ariela Magallanes Soto is a 22 y.o.female who does not have obstructive sleep apnea nor does she have nocturnal oxygen desaturation. No recommendations at this time. This patient is currently pregnant she is welcomed to come in for a repeat study after the birth of her baby.     1. Snoring  This patient does not have " obstructive sleep apnea. This patient will benefit from elevating head of bed and not sleeping on her back.     The risks of untreated sleep apnea were discussed with the patient at length. Patients with BRIANDA are at increased risk of cardiovascular disease including coronary artery disease, systemic arterial hypertension, pulmonary arterial hypertension, cardiac arrhythmias, and stroke. BRIANDA patients have an increased risk of motor vehicle accidents, type 2 diabetes, chronic kidney disease, and non-alcoholic liver disease. The patient was advised to avoid driving a motor vehicle when drowsy.  Have advised the patient to follow up with the appropriate healthcare practitioners for all other medical problems and issues.    RETURN TO CLINIC: Return if symptoms worsen or fail to improve.    My total time spent caring for the patient on the day of the encounter was 40 minutes. This includes time spent on a thorough chart review including other physician notes, all sleep studies, as well as critical labs and pulmonary and cardiac studies.  Additionally, it includes a thorough discussion of good sleep hygiene and stimulus control, as well as  the need for consistency in terms of sleep preparation and practice.    Please note that this dictation was created using voice recognition software.  I have made every reasonable attempt to correct obvious errors, I expect that there are errors of grammar and possibly content that I did not discover before finalizing this note.

## 2023-03-09 ENCOUNTER — HOSPITAL ENCOUNTER (OUTPATIENT)
Dept: LAB | Facility: MEDICAL CENTER | Age: 23
End: 2023-03-09
Attending: OBSTETRICS & GYNECOLOGY
Payer: COMMERCIAL

## 2023-03-09 PROCEDURE — 82105 ALPHA-FETOPROTEIN SERUM: CPT

## 2023-03-09 PROCEDURE — 36415 COLL VENOUS BLD VENIPUNCTURE: CPT

## 2023-03-12 LAB
# FETUSES US: NORMAL
AFP MOM SERPL: 1.31
AFP SERPL-MCNC: 99 NG/ML
AGE - REPORTED: 25.3 YR
CURRENT SMOKER: NO
FAMILY MEMBER DISEASES HX: NO
GA METHOD: NORMAL
GA: NORMAL WK
IDDM PATIENT QL: NO
INTEGRATED SCN PATIENT-IMP: NORMAL
SPECIMEN DRAWN SERPL: NORMAL

## 2023-03-16 ENCOUNTER — OFFICE VISIT (OUTPATIENT)
Dept: URGENT CARE | Facility: PHYSICIAN GROUP | Age: 23
End: 2023-03-16
Payer: COMMERCIAL

## 2023-03-16 VITALS
TEMPERATURE: 99.2 F | OXYGEN SATURATION: 95 % | RESPIRATION RATE: 14 BRPM | WEIGHT: 150 LBS | HEIGHT: 62 IN | HEART RATE: 104 BPM | SYSTOLIC BLOOD PRESSURE: 108 MMHG | BODY MASS INDEX: 27.6 KG/M2 | DIASTOLIC BLOOD PRESSURE: 68 MMHG

## 2023-03-16 DIAGNOSIS — T88.7XXA MEDICATION SIDE EFFECT: ICD-10-CM

## 2023-03-16 DIAGNOSIS — R19.7 DIARRHEA, UNSPECIFIED TYPE: ICD-10-CM

## 2023-03-16 DIAGNOSIS — R11.0 NAUSEA: ICD-10-CM

## 2023-03-16 PROCEDURE — 99214 OFFICE O/P EST MOD 30 MIN: CPT | Performed by: NURSE PRACTITIONER

## 2023-03-16 ASSESSMENT — FIBROSIS 4 INDEX: FIB4 SCORE: 0.49

## 2023-03-16 NOTE — PROGRESS NOTES
Ariela Magallanes Soto is a 22 y.o. female who presents for Medication Reaction (Recently started taking lexapro again, did half the dose as told, made her nauseous and in concerned since she is 22 weeks pregnant, x 1 day)      HPI  This is a new problem. Ariela Magallanes Soto is a 22 y.o. patient who presents to urgent care with c/o: started taking lexapro last night after being off of it for awhile ( stopped in November - was tolerating it well) . Felt shaky and was vomiting t/o the night.  Taking 10 mg dose which is half o her normal dose. Did not sleep well secondary to her diarrhea and nausea.   See by psychiatrist on Monday.   She is 22 weeks pregnant.   Has not felt baby move yet. Had US last week. Her placenta is in the front.   Treatments tried: none   Denies abd pain, VB, LOF, low back pain, fever, chills, jenny sx, suicidal ideations.    No other aggravating or alleviating factors.       ROS See HPI    Allergies:     No Known Allergies    PMSFS Hx:  Past Medical History:   Diagnosis Date    Abdominal pain     Apnea, sleep     Asthma     Chickenpox     Constipation     Daytime sleepiness     Depression     Difficulty breathing     Frequent headaches     Frequent urination     Insomnia     Painful joint     Ringing in ears     Snoring     Vision loss     Wears glasses      History reviewed. No pertinent surgical history.  History reviewed. No pertinent family history.  Social History     Tobacco Use    Smoking status: Former     Packs/day: 0.00     Years: 4.00     Pack years: 0.00     Types: Cigarettes, Electronic Cigarettes     Start date: 2018     Quit date: 10/25/2022     Years since quittin.3    Smokeless tobacco: Never    Tobacco comments:     Would only use e-cigs. Started switching off to cigarettes at the beginning of .   Substance Use Topics    Alcohol use: Not Currently     Alcohol/week: 4.8 oz     Types: 4 Cans of beer, 4 Shots of liquor per week     Comment: This was between 1-3  "times a week       Problems:   There is no problem list on file for this patient.      Medications:   Current Outpatient Medications on File Prior to Visit   Medication Sig Dispense Refill    albuterol 108 (90 Base) MCG/ACT Aero Soln inhalation aerosol Inhale 1-2 Puffs every 6 hours as needed for Shortness of Breath. 8.5 g 0    methocarbamol (ROBAXIN) 500 MG Tab Take 500 mg by mouth 4 times a day.      lamoTRIgine (LAMICTAL) 100 MG Tab Take 100 mg by mouth every day. (Patient not taking: Reported on 11/29/2022)      escitalopram (LEXAPRO) 20 MG tablet Take 20 mg by mouth every day. (Patient not taking: Reported on 11/29/2022)      traZODone (DESYREL) 50 MG Tab Take 50 mg by mouth every evening. (Patient not taking: Reported on 11/29/2022)      lamoTRIgine (LAMICTAL) 25 MG Tab       naproxen (NAPROSYN) 500 MG Tab  (Patient not taking: Reported on 11/29/2022)      citalopram (CELEXA) 20 MG Tab Take 20 mg by mouth every day.      famotidine (PEPCID) 20 MG Tab Take 20 mg by mouth 2 times a day.      omeprazole (PRILOSEC) 40 MG delayed-release capsule Take 1 capsule by mouth every day. (Patient not taking: Reported on 11/29/2022) 30 capsule 0    ibuprofen (MOTRIN) 200 MG Tab Take 200 mg by mouth every 6 hours as needed.       No current facility-administered medications on file prior to visit.          Objective:     /68   Pulse (!) 104   Temp 37.3 °C (99.2 °F)   Resp 14   Ht 1.575 m (5' 2\")   Wt 68 kg (150 lb)   SpO2 95%   BMI 27.44 kg/m²     Physical Exam  Vitals and nursing note reviewed.   Constitutional:       Appearance: Normal appearance. She is normal weight.   Cardiovascular:      Rate and Rhythm: Normal rate.      Pulses: Normal pulses.   Pulmonary:      Effort: Pulmonary effort is normal.   Abdominal:      Palpations: Abdomen is soft.      Tenderness: There is no right CVA tenderness or left CVA tenderness.   Skin:     General: Skin is warm.      Capillary Refill: Capillary refill takes less than " 2 seconds.   Neurological:      Mental Status: She is alert and oriented to person, place, and time.   Psychiatric:         Mood and Affect: Mood normal.         Behavior: Behavior normal.         Thought Content: Thought content normal.         Assessment /Associated Orders:      1. Medication side effect        2. Diarrhea, unspecified type        3. Nausea              Medical Decision Making:    Pt is clinically stable at today's acute urgent care visit.  No acute distress noted. Appropriate for outpatient care at this time.   Acute problem today with uncertain prognosis.   Advised to discuss  her medication dosing with her psychiatrist and her OB GYN.  Recommend increasing her appointments with psychiatrist due to pregnancy and fluctuating pregnancy hormones as well as intolerance of some of her medications and weaning off some of her medications.  Advised to take medications as they are prescribed and informing her providers of the dosages that she is taking  For control of nausea she can try over-the-counter Unisom and vitamin B 6.  She can also try ginger root soap and water with fruit or ginger candies or ginger ale  Keep diet light if nauseated  Stay well-hydrated  Note provided for work    Discussed Dx, management options (risks,benefits, and alternatives to planned treatment), natural progression and supportive care.  Expressed understanding and the treatment plan was agreed upon.   Questions were encouraged and answered   Return to urgent care prn if new or worsening sx or if there is no improvement in condition prn.    Educated in Red flags and indications to immediately call 911 or present to the Emergency Department.       Time I spent evaluating Ariela Magallanes Soto in urgent care today was 39  minutes. This time includes preparing for visit, reviewing any pertinent notes or test results, counseling/education, exam, obtaining HPI, interpretation of lab tests, medication management and  documentation as indicated above.Time does not include separately billable procedures noted .       Please note that this dictation was created using voice recognition software. I have worked with consultants from the vendor as well as technical experts from [facility] to optimize the interface. I have made every reasonable attempt to correct obvious errors, but I expect that there are errors of grammar and possibly content that I did not discover before finalizing the note.  This note was electronically signed by provider

## 2023-03-16 NOTE — LETTER
March 16, 2023       Patient: Ariela Magallanes Soto   YOB: 2000   Date of Visit: 3/16/2023         To Whom It May Concern:    In my medical opinion, I recommend that Ariela Magallanes Soto return to full duty, no restrictions.              Sincerely,          KAYE Vitale  Electronically Signed

## 2023-03-22 ENCOUNTER — NON-PROVIDER VISIT (OUTPATIENT)
Dept: URGENT CARE | Facility: PHYSICIAN GROUP | Age: 23
End: 2023-03-22

## 2023-03-22 DIAGNOSIS — Z11.1 PPD SCREENING TEST: ICD-10-CM

## 2023-03-22 PROCEDURE — 86580 TB INTRADERMAL TEST: CPT | Performed by: NURSE PRACTITIONER

## 2023-03-23 NOTE — PROGRESS NOTES
Ariela Magallanes Soto is a 22 y.o. female here for a non-provider visit for PPD placement -- Step 1 of 1    Reason for PPD:  work requirement    1. TB evaluation questionnaire completed by patient? Yes      -  If any answers marked yes did you contact a provider prior to placing? No  2.  Patient notified to return to clinic for reading on: Friday after 1127 and Saturday before 1127  3.  PPD Placement documentation completed on TB evaluation questionnaire? Yes  4.  Location of TB evaluation questionnaire filed:

## 2023-03-25 ENCOUNTER — NON-PROVIDER VISIT (OUTPATIENT)
Dept: URGENT CARE | Facility: PHYSICIAN GROUP | Age: 23
End: 2023-03-25

## 2023-03-25 NOTE — PROGRESS NOTES
Ariela Magallanes Soto is a 22 y.o. female here for a non-provider visit for PPD reading -- Step 1 of 1.      1.  Resulted in Epic under enter/edit results? Yes   2.  TB evaluation questionnaire scanned into chart and original given to patient? YES    3. Was induration greater than 0 mm? No.    If Step 1 of 2, when is patient returning for second step (delete if N/A): n/a    Routed to PCP? No

## 2023-03-29 ENCOUNTER — HOSPITAL ENCOUNTER (OUTPATIENT)
Facility: MEDICAL CENTER | Age: 23
End: 2023-03-29
Attending: OBSTETRICS & GYNECOLOGY
Payer: COMMERCIAL

## 2023-03-29 LAB
GLUCOSE 1H P 50 G GLC PO SERPL-MCNC: 131 MG/DL (ref 70–139)
HCT VFR BLD AUTO: 36.4 % (ref 37–47)
HGB BLD-MCNC: 12.1 G/DL (ref 12–16)
PLATELET # BLD AUTO: 257 K/UL (ref 164–446)
T PALLIDUM AB SER QL IA: NORMAL

## 2023-03-29 PROCEDURE — 85018 HEMOGLOBIN: CPT

## 2023-03-29 PROCEDURE — 85014 HEMATOCRIT: CPT

## 2023-03-29 PROCEDURE — 85049 AUTOMATED PLATELET COUNT: CPT

## 2023-03-29 PROCEDURE — 86780 TREPONEMA PALLIDUM: CPT

## 2023-03-29 PROCEDURE — 82950 GLUCOSE TEST: CPT

## 2023-03-29 PROCEDURE — 36415 COLL VENOUS BLD VENIPUNCTURE: CPT

## 2023-06-13 LAB — GP B STREP DNA SPEC QL NAA+PROBE: NORMAL

## 2023-07-18 ENCOUNTER — APPOINTMENT (OUTPATIENT)
Dept: OBGYN | Facility: MEDICAL CENTER | Age: 23
End: 2023-07-18
Attending: OBSTETRICS & GYNECOLOGY
Payer: COMMERCIAL

## 2023-07-18 ENCOUNTER — HOSPITAL ENCOUNTER (INPATIENT)
Facility: MEDICAL CENTER | Age: 23
LOS: 4 days | End: 2023-07-22
Attending: OBSTETRICS & GYNECOLOGY | Admitting: OBSTETRICS & GYNECOLOGY
Payer: COMMERCIAL

## 2023-07-18 DIAGNOSIS — G89.18 POST-OPERATIVE PAIN: ICD-10-CM

## 2023-07-18 LAB
BASOPHILS # BLD AUTO: 0.5 % (ref 0–1.8)
BASOPHILS # BLD: 0.04 K/UL (ref 0–0.12)
EOSINOPHIL # BLD AUTO: 0.1 K/UL (ref 0–0.51)
EOSINOPHIL NFR BLD: 1.4 % (ref 0–6.9)
ERYTHROCYTE [DISTWIDTH] IN BLOOD BY AUTOMATED COUNT: 44.7 FL (ref 35.9–50)
HCT VFR BLD AUTO: 30.6 % (ref 37–47)
HGB BLD-MCNC: 10 G/DL (ref 12–16)
HOLDING TUBE BB 8507: NORMAL
IMM GRANULOCYTES # BLD AUTO: 0.04 K/UL (ref 0–0.11)
IMM GRANULOCYTES NFR BLD AUTO: 0.5 % (ref 0–0.9)
LYMPHOCYTES # BLD AUTO: 1.06 K/UL (ref 1–4.8)
LYMPHOCYTES NFR BLD: 14.4 % (ref 22–41)
MCH RBC QN AUTO: 28.8 PG (ref 27–33)
MCHC RBC AUTO-ENTMCNC: 32.7 G/DL (ref 32.2–35.5)
MCV RBC AUTO: 88.2 FL (ref 81.4–97.8)
MONOCYTES # BLD AUTO: 0.47 K/UL (ref 0–0.85)
MONOCYTES NFR BLD AUTO: 6.4 % (ref 0–13.4)
NEUTROPHILS # BLD AUTO: 5.64 K/UL (ref 1.82–7.42)
NEUTROPHILS NFR BLD: 76.8 % (ref 44–72)
NRBC # BLD AUTO: 0 K/UL
NRBC BLD-RTO: 0 /100 WBC (ref 0–0.2)
PLATELET # BLD AUTO: 228 K/UL (ref 164–446)
PMV BLD AUTO: 11.9 FL (ref 9–12.9)
RBC # BLD AUTO: 3.47 M/UL (ref 4.2–5.4)
T PALLIDUM AB SER QL IA: NORMAL
WBC # BLD AUTO: 7.4 K/UL (ref 4.8–10.8)

## 2023-07-18 PROCEDURE — 700105 HCHG RX REV CODE 258: Mod: JZ | Performed by: OBSTETRICS & GYNECOLOGY

## 2023-07-18 PROCEDURE — 36415 COLL VENOUS BLD VENIPUNCTURE: CPT

## 2023-07-18 PROCEDURE — 86780 TREPONEMA PALLIDUM: CPT

## 2023-07-18 PROCEDURE — A9270 NON-COVERED ITEM OR SERVICE: HCPCS | Performed by: OBSTETRICS & GYNECOLOGY

## 2023-07-18 PROCEDURE — 3E0P7VZ INTRODUCTION OF HORMONE INTO FEMALE REPRODUCTIVE, VIA NATURAL OR ARTIFICIAL OPENING: ICD-10-PCS | Performed by: OBSTETRICS & GYNECOLOGY

## 2023-07-18 PROCEDURE — 770002 HCHG ROOM/CARE - OB PRIVATE (112)

## 2023-07-18 PROCEDURE — 3E033VJ INTRODUCTION OF OTHER HORMONE INTO PERIPHERAL VEIN, PERCUTANEOUS APPROACH: ICD-10-PCS | Performed by: OBSTETRICS & GYNECOLOGY

## 2023-07-18 PROCEDURE — 700111 HCHG RX REV CODE 636 W/ 250 OVERRIDE (IP): Mod: JZ | Performed by: OBSTETRICS & GYNECOLOGY

## 2023-07-18 PROCEDURE — 85025 COMPLETE CBC W/AUTO DIFF WBC: CPT

## 2023-07-18 PROCEDURE — 700102 HCHG RX REV CODE 250 W/ 637 OVERRIDE(OP): Performed by: OBSTETRICS & GYNECOLOGY

## 2023-07-18 RX ORDER — LIDOCAINE HYDROCHLORIDE 10 MG/ML
20 INJECTION, SOLUTION INFILTRATION; PERINEURAL
Status: DISCONTINUED | OUTPATIENT
Start: 2023-07-18 | End: 2023-07-20 | Stop reason: HOSPADM

## 2023-07-18 RX ORDER — METHYLERGONOVINE MALEATE 0.2 MG/ML
0.2 INJECTION INTRAVENOUS
Status: DISCONTINUED | OUTPATIENT
Start: 2023-07-18 | End: 2023-07-19

## 2023-07-18 RX ORDER — TERBUTALINE SULFATE 1 MG/ML
0.25 INJECTION, SOLUTION SUBCUTANEOUS
Status: DISCONTINUED | OUTPATIENT
Start: 2023-07-18 | End: 2023-07-20 | Stop reason: HOSPADM

## 2023-07-18 RX ORDER — ONDANSETRON 2 MG/ML
4 INJECTION INTRAMUSCULAR; INTRAVENOUS EVERY 6 HOURS PRN
Status: DISCONTINUED | OUTPATIENT
Start: 2023-07-18 | End: 2023-07-20 | Stop reason: HOSPADM

## 2023-07-18 RX ORDER — MISOPROSTOL 200 UG/1
800 TABLET ORAL
Status: DISCONTINUED | OUTPATIENT
Start: 2023-07-18 | End: 2023-07-19

## 2023-07-18 RX ORDER — SODIUM CHLORIDE, SODIUM LACTATE, POTASSIUM CHLORIDE, CALCIUM CHLORIDE 600; 310; 30; 20 MG/100ML; MG/100ML; MG/100ML; MG/100ML
INJECTION, SOLUTION INTRAVENOUS CONTINUOUS
Status: DISCONTINUED | OUTPATIENT
Start: 2023-07-18 | End: 2023-07-19

## 2023-07-18 RX ORDER — OXYTOCIN 10 [USP'U]/ML
10 INJECTION, SOLUTION INTRAMUSCULAR; INTRAVENOUS
Status: DISCONTINUED | OUTPATIENT
Start: 2023-07-18 | End: 2023-07-19

## 2023-07-18 RX ORDER — TERBUTALINE SULFATE 1 MG/ML
0.25 INJECTION, SOLUTION SUBCUTANEOUS
Status: DISCONTINUED | OUTPATIENT
Start: 2023-07-18 | End: 2023-07-19

## 2023-07-18 RX ORDER — ONDANSETRON 4 MG/1
4 TABLET, ORALLY DISINTEGRATING ORAL EVERY 6 HOURS PRN
Status: DISCONTINUED | OUTPATIENT
Start: 2023-07-18 | End: 2023-07-20 | Stop reason: HOSPADM

## 2023-07-18 RX ORDER — ACETAMINOPHEN 500 MG
1000 TABLET ORAL
Status: COMPLETED | OUTPATIENT
Start: 2023-07-18 | End: 2023-07-19

## 2023-07-18 RX ORDER — IBUPROFEN 800 MG/1
800 TABLET ORAL
Status: DISCONTINUED | OUTPATIENT
Start: 2023-07-18 | End: 2023-07-20 | Stop reason: HOSPADM

## 2023-07-18 RX ADMIN — MISOPROSTOL 25 MCG: 100 TABLET ORAL at 15:06

## 2023-07-18 RX ADMIN — FENTANYL CITRATE 100 MCG: 50 INJECTION, SOLUTION INTRAMUSCULAR; INTRAVENOUS at 23:56

## 2023-07-18 RX ADMIN — SODIUM CHLORIDE, POTASSIUM CHLORIDE, SODIUM LACTATE AND CALCIUM CHLORIDE 500 ML: 600; 310; 30; 20 INJECTION, SOLUTION INTRAVENOUS at 16:30

## 2023-07-18 ASSESSMENT — LIFESTYLE VARIABLES
ALCOHOL_USE: NO
EVER_SMOKED: YES

## 2023-07-18 ASSESSMENT — PATIENT HEALTH QUESTIONNAIRE - PHQ9
1. LITTLE INTEREST OR PLEASURE IN DOING THINGS: NOT AT ALL
2. FEELING DOWN, DEPRESSED, IRRITABLE, OR HOPELESS: NOT AT ALL
SUM OF ALL RESPONSES TO PHQ9 QUESTIONS 1 AND 2: 0

## 2023-07-18 NOTE — PROGRESS NOTES
1200 - Pt a , here for IOL, elective. Pt was due 2023 making her 40.1 weeks today. Pt denies any LOF, bleeding, or UCs. Pt states +FM. SVE: /-2. RN spoke with Dr. Poole, orders received to place a dose of Cytotec then begin pitocin after four hours. Plan of care discussed with pt.   1506 - Pt finished eating lunch. Cytotec then placed.   1630 - 500ml bolus given for elevated baseline. Tracing reviewed with Charge RN Mike. Pt states baby moving quite a bit, audible FM.   1710 - SVE recheck due to tracing. SVE: -/-2. VSS. Pt feeling some cramping. Audible FM. Call placed to Dr. Virgilio MD reviewing tracing. Per MD, pt's NSTs baseline HR was 160s throughout pregnancy. No new orders received, MD aware of tracing.    - Report to Rebecca MARIA RN.

## 2023-07-19 ENCOUNTER — ANESTHESIA EVENT (OUTPATIENT)
Dept: OBGYN | Facility: MEDICAL CENTER | Age: 23
End: 2023-07-19
Payer: COMMERCIAL

## 2023-07-19 ENCOUNTER — ANESTHESIA (OUTPATIENT)
Dept: OBGYN | Facility: MEDICAL CENTER | Age: 23
End: 2023-07-19
Payer: COMMERCIAL

## 2023-07-19 LAB
ABO GROUP BLD: NORMAL
ALBUMIN SERPL BCP-MCNC: 2.4 G/DL (ref 3.2–4.9)
ALBUMIN/GLOB SERPL: 1.3 G/DL
ALP SERPL-CCNC: 132 U/L (ref 30–99)
ALT SERPL-CCNC: 12 U/L (ref 2–50)
ANION GAP SERPL CALC-SCNC: 13 MMOL/L (ref 7–16)
AST SERPL-CCNC: 18 U/L (ref 12–45)
BARCODED ABORH UBTYP: 5100
BARCODED ABORH UBTYP: 5100
BARCODED PRD CODE UBPRD: NORMAL
BARCODED PRD CODE UBPRD: NORMAL
BARCODED UNIT NUM UBUNT: NORMAL
BARCODED UNIT NUM UBUNT: NORMAL
BASOPHILS # BLD AUTO: 0.2 % (ref 0–1.8)
BASOPHILS # BLD: 0.04 K/UL (ref 0–0.12)
BILIRUB SERPL-MCNC: 0.2 MG/DL (ref 0.1–1.5)
BLD GP AB SCN SERPL QL: NORMAL
BUN SERPL-MCNC: 10 MG/DL (ref 8–22)
CALCIUM ALBUM COR SERPL-MCNC: 8.6 MG/DL (ref 8.5–10.5)
CALCIUM SERPL-MCNC: 7.3 MG/DL (ref 8.5–10.5)
CHLORIDE SERPL-SCNC: 103 MMOL/L (ref 96–112)
CO2 SERPL-SCNC: 19 MMOL/L (ref 20–33)
COMPONENT R 8504R: NORMAL
COMPONENT R 8504R: NORMAL
CREAT SERPL-MCNC: 1.25 MG/DL (ref 0.5–1.4)
EOSINOPHIL # BLD AUTO: 0 K/UL (ref 0–0.51)
EOSINOPHIL NFR BLD: 0 % (ref 0–6.9)
ERYTHROCYTE [DISTWIDTH] IN BLOOD BY AUTOMATED COUNT: 45.8 FL (ref 35.9–50)
GFR SERPLBLD CREATININE-BSD FMLA CKD-EPI: 61 ML/MIN/1.73 M 2
GLOBULIN SER CALC-MCNC: 1.8 G/DL (ref 1.9–3.5)
GLUCOSE SERPL-MCNC: 116 MG/DL (ref 65–99)
HCT VFR BLD AUTO: 20.7 % (ref 37–47)
HGB BLD-MCNC: 6.6 G/DL (ref 12–16)
IMM GRANULOCYTES # BLD AUTO: 0.09 K/UL (ref 0–0.11)
IMM GRANULOCYTES NFR BLD AUTO: 0.5 % (ref 0–0.9)
LYMPHOCYTES # BLD AUTO: 0.73 K/UL (ref 1–4.8)
LYMPHOCYTES NFR BLD: 3.7 % (ref 22–41)
MCH RBC QN AUTO: 28.9 PG (ref 27–33)
MCHC RBC AUTO-ENTMCNC: 31.9 G/DL (ref 32.2–35.5)
MCV RBC AUTO: 90.8 FL (ref 81.4–97.8)
MONOCYTES # BLD AUTO: 0.86 K/UL (ref 0–0.85)
MONOCYTES NFR BLD AUTO: 4.3 % (ref 0–13.4)
NEUTROPHILS # BLD AUTO: 18.2 K/UL (ref 1.82–7.42)
NEUTROPHILS NFR BLD: 91.3 % (ref 44–72)
NRBC # BLD AUTO: 0 K/UL
NRBC BLD-RTO: 0 /100 WBC (ref 0–0.2)
PLATELET # BLD AUTO: 168 K/UL (ref 164–446)
PMV BLD AUTO: 12.4 FL (ref 9–12.9)
POTASSIUM SERPL-SCNC: 4 MMOL/L (ref 3.6–5.5)
PRODUCT TYPE UPROD: NORMAL
PRODUCT TYPE UPROD: NORMAL
PROT SERPL-MCNC: 4.2 G/DL (ref 6–8.2)
RBC # BLD AUTO: 2.28 M/UL (ref 4.2–5.4)
RH BLD: NORMAL
SODIUM SERPL-SCNC: 135 MMOL/L (ref 135–145)
UNIT STATUS USTAT: NORMAL
UNIT STATUS USTAT: NORMAL
WBC # BLD AUTO: 19.9 K/UL (ref 4.8–10.8)

## 2023-07-19 PROCEDURE — 59514 CESAREAN DELIVERY ONLY: CPT | Mod: 80 | Performed by: OBSTETRICS & GYNECOLOGY

## 2023-07-19 PROCEDURE — 36430 TRANSFUSION BLD/BLD COMPNT: CPT

## 2023-07-19 PROCEDURE — 3E0E37Z INTRODUCTION OF ELECTROLYTIC AND WATER BALANCE SUBSTANCE INTO PRODUCTS OF CONCEPTION, PERCUTANEOUS APPROACH: ICD-10-PCS | Performed by: OBSTETRICS & GYNECOLOGY

## 2023-07-19 PROCEDURE — 700111 HCHG RX REV CODE 636 W/ 250 OVERRIDE (IP): Mod: JZ | Performed by: OBSTETRICS & GYNECOLOGY

## 2023-07-19 PROCEDURE — 700105 HCHG RX REV CODE 258: Performed by: ANESTHESIOLOGY

## 2023-07-19 PROCEDURE — 30233N1 TRANSFUSION OF NONAUTOLOGOUS RED BLOOD CELLS INTO PERIPHERAL VEIN, PERCUTANEOUS APPROACH: ICD-10-PCS | Performed by: OBSTETRICS & GYNECOLOGY

## 2023-07-19 PROCEDURE — 700101 HCHG RX REV CODE 250: Performed by: OBSTETRICS & GYNECOLOGY

## 2023-07-19 PROCEDURE — 160029 HCHG SURGERY MINUTES - 1ST 30 MINS LEVEL 4: Performed by: OBSTETRICS & GYNECOLOGY

## 2023-07-19 PROCEDURE — 700102 HCHG RX REV CODE 250 W/ 637 OVERRIDE(OP): Performed by: ANESTHESIOLOGY

## 2023-07-19 PROCEDURE — 700111 HCHG RX REV CODE 636 W/ 250 OVERRIDE (IP): Performed by: ANESTHESIOLOGY

## 2023-07-19 PROCEDURE — 700111 HCHG RX REV CODE 636 W/ 250 OVERRIDE (IP): Mod: JZ | Performed by: ANESTHESIOLOGY

## 2023-07-19 PROCEDURE — A9270 NON-COVERED ITEM OR SERVICE: HCPCS | Performed by: ANESTHESIOLOGY

## 2023-07-19 PROCEDURE — 770002 HCHG ROOM/CARE - OB PRIVATE (112)

## 2023-07-19 PROCEDURE — A9270 NON-COVERED ITEM OR SERVICE: HCPCS | Performed by: OBSTETRICS & GYNECOLOGY

## 2023-07-19 PROCEDURE — 160035 HCHG PACU - 1ST 60 MINS PHASE I: Performed by: OBSTETRICS & GYNECOLOGY

## 2023-07-19 PROCEDURE — 86900 BLOOD TYPING SEROLOGIC ABO: CPT

## 2023-07-19 PROCEDURE — 160048 HCHG OR STATISTICAL LEVEL 1-5: Performed by: OBSTETRICS & GYNECOLOGY

## 2023-07-19 PROCEDURE — 86850 RBC ANTIBODY SCREEN: CPT

## 2023-07-19 PROCEDURE — 160041 HCHG SURGERY MINUTES - EA ADDL 1 MIN LEVEL 4: Performed by: OBSTETRICS & GYNECOLOGY

## 2023-07-19 PROCEDURE — 01968 ANES/ANALG CS DLVR NEURAXIAL: CPT | Performed by: ANESTHESIOLOGY

## 2023-07-19 PROCEDURE — 160009 HCHG ANES TIME/MIN: Performed by: OBSTETRICS & GYNECOLOGY

## 2023-07-19 PROCEDURE — 86901 BLOOD TYPING SEROLOGIC RH(D): CPT

## 2023-07-19 PROCEDURE — 700105 HCHG RX REV CODE 258: Performed by: OBSTETRICS & GYNECOLOGY

## 2023-07-19 PROCEDURE — 80053 COMPREHEN METABOLIC PANEL: CPT

## 2023-07-19 PROCEDURE — 160002 HCHG RECOVERY MINUTES (STAT): Performed by: OBSTETRICS & GYNECOLOGY

## 2023-07-19 PROCEDURE — 01967 NEURAXL LBR ANES VAG DLVR: CPT | Performed by: ANESTHESIOLOGY

## 2023-07-19 PROCEDURE — C1755 CATHETER, INTRASPINAL: HCPCS | Performed by: OBSTETRICS & GYNECOLOGY

## 2023-07-19 PROCEDURE — 36415 COLL VENOUS BLD VENIPUNCTURE: CPT

## 2023-07-19 PROCEDURE — 700102 HCHG RX REV CODE 250 W/ 637 OVERRIDE(OP): Performed by: OBSTETRICS & GYNECOLOGY

## 2023-07-19 PROCEDURE — 303615 HCHG EPIDURAL/SPINAL ANESTHESIA FOR LABOR

## 2023-07-19 PROCEDURE — 85025 COMPLETE CBC W/AUTO DIFF WBC: CPT

## 2023-07-19 PROCEDURE — 700101 HCHG RX REV CODE 250: Performed by: ANESTHESIOLOGY

## 2023-07-19 PROCEDURE — 700111 HCHG RX REV CODE 636 W/ 250 OVERRIDE (IP): Mod: JZ

## 2023-07-19 PROCEDURE — 86923 COMPATIBILITY TEST ELECTRIC: CPT

## 2023-07-19 PROCEDURE — P9016 RBC LEUKOCYTES REDUCED: HCPCS

## 2023-07-19 RX ORDER — HYDROMORPHONE HYDROCHLORIDE 1 MG/ML
0.2 INJECTION, SOLUTION INTRAMUSCULAR; INTRAVENOUS; SUBCUTANEOUS
Status: DISCONTINUED | OUTPATIENT
Start: 2023-07-19 | End: 2023-07-20 | Stop reason: HOSPADM

## 2023-07-19 RX ORDER — CITRIC ACID/SODIUM CITRATE 334-500MG
30 SOLUTION, ORAL ORAL ONCE
Status: COMPLETED | OUTPATIENT
Start: 2023-07-19 | End: 2023-07-19

## 2023-07-19 RX ORDER — SODIUM CHLORIDE, SODIUM GLUCONATE, SODIUM ACETATE, POTASSIUM CHLORIDE AND MAGNESIUM CHLORIDE 526; 502; 368; 37; 30 MG/100ML; MG/100ML; MG/100ML; MG/100ML; MG/100ML
1000 INJECTION, SOLUTION INTRAVENOUS ONCE
Status: COMPLETED | OUTPATIENT
Start: 2023-07-19 | End: 2023-07-19

## 2023-07-19 RX ORDER — MIDAZOLAM HYDROCHLORIDE 1 MG/ML
1 INJECTION INTRAMUSCULAR; INTRAVENOUS
Status: DISCONTINUED | OUTPATIENT
Start: 2023-07-19 | End: 2023-07-20 | Stop reason: HOSPADM

## 2023-07-19 RX ORDER — EPHEDRINE SULFATE 50 MG/ML
5 INJECTION, SOLUTION INTRAVENOUS
Status: DISCONTINUED | OUTPATIENT
Start: 2023-07-19 | End: 2023-07-20 | Stop reason: HOSPADM

## 2023-07-19 RX ORDER — METOCLOPRAMIDE HYDROCHLORIDE 5 MG/ML
10 INJECTION INTRAMUSCULAR; INTRAVENOUS ONCE
Status: COMPLETED | OUTPATIENT
Start: 2023-07-19 | End: 2023-07-19

## 2023-07-19 RX ORDER — METOPROLOL TARTRATE 1 MG/ML
1 INJECTION, SOLUTION INTRAVENOUS
Status: DISCONTINUED | OUTPATIENT
Start: 2023-07-19 | End: 2023-07-20 | Stop reason: HOSPADM

## 2023-07-19 RX ORDER — OXYCODONE HCL 5 MG/5 ML
5 SOLUTION, ORAL ORAL
Status: COMPLETED | OUTPATIENT
Start: 2023-07-19 | End: 2023-07-19

## 2023-07-19 RX ORDER — ONDANSETRON 2 MG/ML
INJECTION INTRAMUSCULAR; INTRAVENOUS PRN
Status: DISCONTINUED | OUTPATIENT
Start: 2023-07-19 | End: 2023-07-19 | Stop reason: SURG

## 2023-07-19 RX ORDER — METHYLERGONOVINE MALEATE 0.2 MG/ML
0.2 INJECTION INTRAVENOUS ONCE
Status: DISCONTINUED | OUTPATIENT
Start: 2023-07-19 | End: 2023-07-20 | Stop reason: HOSPADM

## 2023-07-19 RX ORDER — METHYLERGONOVINE MALEATE 0.2 MG/ML
0.2 INJECTION INTRAVENOUS
Status: COMPLETED | OUTPATIENT
Start: 2023-07-19 | End: 2023-07-19

## 2023-07-19 RX ORDER — HALOPERIDOL 5 MG/ML
1 INJECTION INTRAMUSCULAR
Status: DISCONTINUED | OUTPATIENT
Start: 2023-07-19 | End: 2023-07-20 | Stop reason: HOSPADM

## 2023-07-19 RX ORDER — BUPIVACAINE HYDROCHLORIDE 2.5 MG/ML
INJECTION, SOLUTION EPIDURAL; INFILTRATION; INTRACAUDAL
Status: COMPLETED
Start: 2023-07-19 | End: 2023-07-19

## 2023-07-19 RX ORDER — SODIUM CHLORIDE, SODIUM GLUCONATE, SODIUM ACETATE, POTASSIUM CHLORIDE AND MAGNESIUM CHLORIDE 526; 502; 368; 37; 30 MG/100ML; MG/100ML; MG/100ML; MG/100ML; MG/100ML
INJECTION, SOLUTION INTRAVENOUS
Status: DISCONTINUED | OUTPATIENT
Start: 2023-07-19 | End: 2023-07-19 | Stop reason: SURG

## 2023-07-19 RX ORDER — LIDOCAINE HCL/EPINEPHRINE/PF 2%-1:200K
VIAL (ML) INJECTION PRN
Status: DISCONTINUED | OUTPATIENT
Start: 2023-07-19 | End: 2023-07-19 | Stop reason: SURG

## 2023-07-19 RX ORDER — AZITHROMYCIN 500 MG/5ML
500 INJECTION, POWDER, LYOPHILIZED, FOR SOLUTION INTRAVENOUS ONCE
Status: COMPLETED | OUTPATIENT
Start: 2023-07-19 | End: 2023-07-19

## 2023-07-19 RX ORDER — MISOPROSTOL 200 UG/1
800 TABLET ORAL
Status: COMPLETED | OUTPATIENT
Start: 2023-07-19 | End: 2023-07-19

## 2023-07-19 RX ORDER — HYDRALAZINE HYDROCHLORIDE 20 MG/ML
5 INJECTION INTRAMUSCULAR; INTRAVENOUS
Status: DISCONTINUED | OUTPATIENT
Start: 2023-07-19 | End: 2023-07-20 | Stop reason: HOSPADM

## 2023-07-19 RX ORDER — PHENYLEPHRINE HYDROCHLORIDE 10 MG/ML
INJECTION, SOLUTION INTRAMUSCULAR; INTRAVENOUS; SUBCUTANEOUS PRN
Status: DISCONTINUED | OUTPATIENT
Start: 2023-07-19 | End: 2023-07-19 | Stop reason: SURG

## 2023-07-19 RX ORDER — ROPIVACAINE HYDROCHLORIDE 2 MG/ML
INJECTION, SOLUTION EPIDURAL; INFILTRATION; PERINEURAL CONTINUOUS
Status: DISCONTINUED | OUTPATIENT
Start: 2023-07-19 | End: 2023-07-22 | Stop reason: HOSPADM

## 2023-07-19 RX ORDER — CEFAZOLIN SODIUM 1 G/3ML
INJECTION, POWDER, FOR SOLUTION INTRAMUSCULAR; INTRAVENOUS PRN
Status: DISCONTINUED | OUTPATIENT
Start: 2023-07-19 | End: 2023-07-19 | Stop reason: SURG

## 2023-07-19 RX ORDER — KETOROLAC TROMETHAMINE 30 MG/ML
INJECTION, SOLUTION INTRAMUSCULAR; INTRAVENOUS PRN
Status: DISCONTINUED | OUTPATIENT
Start: 2023-07-19 | End: 2023-07-19 | Stop reason: SURG

## 2023-07-19 RX ORDER — SCOLOPAMINE TRANSDERMAL SYSTEM 1 MG/1
PATCH, EXTENDED RELEASE TRANSDERMAL PRN
Status: DISCONTINUED | OUTPATIENT
Start: 2023-07-19 | End: 2023-07-19 | Stop reason: SURG

## 2023-07-19 RX ORDER — MEPERIDINE HYDROCHLORIDE 25 MG/ML
12.5 INJECTION INTRAMUSCULAR; INTRAVENOUS; SUBCUTANEOUS
Status: DISCONTINUED | OUTPATIENT
Start: 2023-07-19 | End: 2023-07-20 | Stop reason: HOSPADM

## 2023-07-19 RX ORDER — DEXAMETHASONE SODIUM PHOSPHATE 4 MG/ML
INJECTION, SOLUTION INTRA-ARTICULAR; INTRALESIONAL; INTRAMUSCULAR; INTRAVENOUS; SOFT TISSUE PRN
Status: DISCONTINUED | OUTPATIENT
Start: 2023-07-19 | End: 2023-07-19 | Stop reason: SURG

## 2023-07-19 RX ORDER — ROPIVACAINE HYDROCHLORIDE 2 MG/ML
INJECTION, SOLUTION EPIDURAL; INFILTRATION; PERINEURAL
Status: COMPLETED
Start: 2023-07-19 | End: 2023-07-19

## 2023-07-19 RX ORDER — SODIUM CHLORIDE, SODIUM LACTATE, POTASSIUM CHLORIDE, AND CALCIUM CHLORIDE .6; .31; .03; .02 G/100ML; G/100ML; G/100ML; G/100ML
250 INJECTION, SOLUTION INTRAVENOUS PRN
Status: DISCONTINUED | OUTPATIENT
Start: 2023-07-19 | End: 2023-07-20 | Stop reason: HOSPADM

## 2023-07-19 RX ORDER — HYDROMORPHONE HYDROCHLORIDE 1 MG/ML
0.1 INJECTION, SOLUTION INTRAMUSCULAR; INTRAVENOUS; SUBCUTANEOUS
Status: DISCONTINUED | OUTPATIENT
Start: 2023-07-19 | End: 2023-07-20 | Stop reason: HOSPADM

## 2023-07-19 RX ORDER — SODIUM CHLORIDE, SODIUM LACTATE, POTASSIUM CHLORIDE, AND CALCIUM CHLORIDE .6; .31; .03; .02 G/100ML; G/100ML; G/100ML; G/100ML
1000 INJECTION, SOLUTION INTRAVENOUS
Status: DISCONTINUED | OUTPATIENT
Start: 2023-07-19 | End: 2023-07-20 | Stop reason: HOSPADM

## 2023-07-19 RX ORDER — MISOPROSTOL 200 UG/1
800 TABLET ORAL ONCE
Status: DISCONTINUED | OUTPATIENT
Start: 2023-07-19 | End: 2023-07-20 | Stop reason: HOSPADM

## 2023-07-19 RX ORDER — SODIUM CHLORIDE, SODIUM LACTATE, POTASSIUM CHLORIDE, CALCIUM CHLORIDE 600; 310; 30; 20 MG/100ML; MG/100ML; MG/100ML; MG/100ML
INJECTION, SOLUTION INTRAVENOUS ONCE
Status: ACTIVE | OUTPATIENT
Start: 2023-07-19 | End: 2023-07-20

## 2023-07-19 RX ORDER — MORPHINE SULFATE 0.5 MG/ML
INJECTION, SOLUTION EPIDURAL; INTRATHECAL; INTRAVENOUS PRN
Status: DISCONTINUED | OUTPATIENT
Start: 2023-07-19 | End: 2023-07-19 | Stop reason: SURG

## 2023-07-19 RX ORDER — LABETALOL HYDROCHLORIDE 5 MG/ML
5 INJECTION, SOLUTION INTRAVENOUS
Status: DISCONTINUED | OUTPATIENT
Start: 2023-07-19 | End: 2023-07-20 | Stop reason: HOSPADM

## 2023-07-19 RX ORDER — OXYTOCIN 10 [USP'U]/ML
10 INJECTION, SOLUTION INTRAMUSCULAR; INTRAVENOUS
Status: DISCONTINUED | OUTPATIENT
Start: 2023-07-19 | End: 2023-07-22 | Stop reason: HOSPADM

## 2023-07-19 RX ORDER — DIPHENHYDRAMINE HYDROCHLORIDE 50 MG/ML
12.5 INJECTION INTRAMUSCULAR; INTRAVENOUS
Status: DISCONTINUED | OUTPATIENT
Start: 2023-07-19 | End: 2023-07-20 | Stop reason: HOSPADM

## 2023-07-19 RX ORDER — OXYTOCIN 10 [USP'U]/ML
INJECTION, SOLUTION INTRAMUSCULAR; INTRAVENOUS PRN
Status: DISCONTINUED | OUTPATIENT
Start: 2023-07-19 | End: 2023-07-19 | Stop reason: SURG

## 2023-07-19 RX ORDER — ONDANSETRON 2 MG/ML
4 INJECTION INTRAMUSCULAR; INTRAVENOUS
Status: DISCONTINUED | OUTPATIENT
Start: 2023-07-19 | End: 2023-07-20 | Stop reason: HOSPADM

## 2023-07-19 RX ORDER — OXYCODONE HCL 5 MG/5 ML
10 SOLUTION, ORAL ORAL
Status: COMPLETED | OUTPATIENT
Start: 2023-07-19 | End: 2023-07-19

## 2023-07-19 RX ORDER — HYDROMORPHONE HYDROCHLORIDE 1 MG/ML
0.4 INJECTION, SOLUTION INTRAMUSCULAR; INTRAVENOUS; SUBCUTANEOUS
Status: DISCONTINUED | OUTPATIENT
Start: 2023-07-19 | End: 2023-07-20 | Stop reason: HOSPADM

## 2023-07-19 RX ORDER — DEXTROSE, SODIUM CHLORIDE, SODIUM LACTATE, POTASSIUM CHLORIDE, AND CALCIUM CHLORIDE 5; .6; .31; .03; .02 G/100ML; G/100ML; G/100ML; G/100ML; G/100ML
INJECTION, SOLUTION INTRAVENOUS CONTINUOUS
Status: DISCONTINUED | OUTPATIENT
Start: 2023-07-19 | End: 2023-07-22 | Stop reason: HOSPADM

## 2023-07-19 RX ORDER — BUPIVACAINE HYDROCHLORIDE 2.5 MG/ML
INJECTION, SOLUTION EPIDURAL; INFILTRATION; INTRACAUDAL PRN
Status: DISCONTINUED | OUTPATIENT
Start: 2023-07-19 | End: 2023-07-19 | Stop reason: SURG

## 2023-07-19 RX ORDER — SODIUM CHLORIDE, SODIUM GLUCONATE, SODIUM ACETATE, POTASSIUM CHLORIDE AND MAGNESIUM CHLORIDE 526; 502; 368; 37; 30 MG/100ML; MG/100ML; MG/100ML; MG/100ML; MG/100ML
500 INJECTION, SOLUTION INTRAVENOUS CONTINUOUS
Status: DISCONTINUED | OUTPATIENT
Start: 2023-07-19 | End: 2023-07-22 | Stop reason: HOSPADM

## 2023-07-19 RX ADMIN — SODIUM BICARBONATE 2 ML: 84 INJECTION, SOLUTION INTRAVENOUS at 18:21

## 2023-07-19 RX ADMIN — OXYTOCIN 2 MILLI-UNITS/MIN: 10 INJECTION, SOLUTION INTRAMUSCULAR; INTRAVENOUS at 01:04

## 2023-07-19 RX ADMIN — FENTANYL CITRATE 100 MCG: 50 INJECTION, SOLUTION INTRAMUSCULAR; INTRAVENOUS at 01:10

## 2023-07-19 RX ADMIN — OXYCODONE HYDROCHLORIDE 5 MG: 5 SOLUTION ORAL at 20:47

## 2023-07-19 RX ADMIN — SODIUM CHLORIDE, POTASSIUM CHLORIDE, SODIUM LACTATE AND CALCIUM CHLORIDE: 600; 310; 30; 20 INJECTION, SOLUTION INTRAVENOUS at 13:35

## 2023-07-19 RX ADMIN — METHYLERGONOVINE MALEATE 0.2 MG: 0.2 INJECTION, SOLUTION INTRAMUSCULAR; INTRAVENOUS at 19:45

## 2023-07-19 RX ADMIN — PHENYLEPHRINE HYDROCHLORIDE 100 MCG: 10 INJECTION INTRAVENOUS at 19:40

## 2023-07-19 RX ADMIN — HYDROMORPHONE HYDROCHLORIDE 0.2 MG: 1 INJECTION, SOLUTION INTRAMUSCULAR; INTRAVENOUS; SUBCUTANEOUS at 22:09

## 2023-07-19 RX ADMIN — Medication 25 MG: at 18:38

## 2023-07-19 RX ADMIN — FENTANYL CITRATE 100 MCG: 50 INJECTION, SOLUTION INTRAMUSCULAR; INTRAVENOUS at 18:05

## 2023-07-19 RX ADMIN — WATER 500 MG: 1 INJECTION INTRAMUSCULAR; INTRAVENOUS; SUBCUTANEOUS at 17:58

## 2023-07-19 RX ADMIN — SCOPOLAMINE 1 PATCH: 1.5 PATCH, EXTENDED RELEASE TRANSDERMAL at 18:40

## 2023-07-19 RX ADMIN — OXYTOCIN 20 UNITS: 10 INJECTION, SOLUTION INTRAMUSCULAR; INTRAVENOUS at 18:30

## 2023-07-19 RX ADMIN — SODIUM CHLORIDE, SODIUM GLUCONATE, SODIUM ACETATE, POTASSIUM CHLORIDE AND MAGNESIUM CHLORIDE: 526; 502; 368; 37; 30 INJECTION, SOLUTION INTRAVENOUS at 18:09

## 2023-07-19 RX ADMIN — OXYTOCIN 125 ML/HR: 10 INJECTION, SOLUTION INTRAMUSCULAR; INTRAVENOUS at 19:30

## 2023-07-19 RX ADMIN — METOCLOPRAMIDE HYDROCHLORIDE 10 MG: 5 INJECTION INTRAMUSCULAR; INTRAVENOUS at 17:54

## 2023-07-19 RX ADMIN — HYDROMORPHONE HYDROCHLORIDE 0.2 MG: 1 INJECTION, SOLUTION INTRAMUSCULAR; INTRAVENOUS; SUBCUTANEOUS at 23:11

## 2023-07-19 RX ADMIN — MISOPROSTOL 800 MCG: 200 TABLET ORAL at 19:50

## 2023-07-19 RX ADMIN — SODIUM CHLORIDE, POTASSIUM CHLORIDE, SODIUM LACTATE AND CALCIUM CHLORIDE: 600; 310; 30; 20 INJECTION, SOLUTION INTRAVENOUS at 13:12

## 2023-07-19 RX ADMIN — SODIUM CHLORIDE, POTASSIUM CHLORIDE, SODIUM LACTATE AND CALCIUM CHLORIDE: 600; 310; 30; 20 INJECTION, SOLUTION INTRAVENOUS at 15:30

## 2023-07-19 RX ADMIN — ONDANSETRON 4 MG: 2 INJECTION INTRAMUSCULAR; INTRAVENOUS at 10:02

## 2023-07-19 RX ADMIN — SODIUM CHLORIDE, SODIUM GLUCONATE, SODIUM ACETATE, POTASSIUM CHLORIDE AND MAGNESIUM CHLORIDE 1000 ML: 526; 502; 368; 37; 30 INJECTION, SOLUTION INTRAVENOUS at 17:53

## 2023-07-19 RX ADMIN — ACETAMINOPHEN 1000 MG: 500 TABLET, FILM COATED ORAL at 16:20

## 2023-07-19 RX ADMIN — SODIUM CHLORIDE, SODIUM LACTATE, POTASSIUM CHLORIDE, CALCIUM CHLORIDE AND DEXTROSE MONOHYDRATE: 5; 600; 310; 30; 20 INJECTION, SOLUTION INTRAVENOUS at 15:36

## 2023-07-19 RX ADMIN — ROPIVACAINE HYDROCHLORIDE 200 MG: 2 INJECTION, SOLUTION EPIDURAL; INFILTRATION at 06:05

## 2023-07-19 RX ADMIN — FAMOTIDINE 20 MG: 10 INJECTION INTRAVENOUS at 17:54

## 2023-07-19 RX ADMIN — FENTANYL CITRATE 100 MCG: 50 INJECTION, SOLUTION INTRAMUSCULAR; INTRAVENOUS at 06:01

## 2023-07-19 RX ADMIN — SODIUM CITRATE AND CITRIC ACID MONOHYDRATE 30 ML: 500; 334 SOLUTION ORAL at 17:54

## 2023-07-19 RX ADMIN — FENTANYL CITRATE 100 MCG: 50 INJECTION, SOLUTION INTRAMUSCULAR; INTRAVENOUS at 03:26

## 2023-07-19 RX ADMIN — LIDOCAINE HYDROCHLORIDE,EPINEPHRINE BITARTRATE 5 ML: 20; .005 INJECTION, SOLUTION EPIDURAL; INFILTRATION; INTRACAUDAL; PERINEURAL at 18:24

## 2023-07-19 RX ADMIN — SODIUM CHLORIDE, SODIUM GLUCONATE, SODIUM ACETATE, POTASSIUM CHLORIDE AND MAGNESIUM CHLORIDE 1000 ML: 526; 502; 368; 37; 30 INJECTION, SOLUTION INTRAVENOUS at 20:09

## 2023-07-19 RX ADMIN — KETOROLAC TROMETHAMINE 30 MG: 30 INJECTION, SOLUTION INTRAMUSCULAR; INTRAVENOUS at 18:46

## 2023-07-19 RX ADMIN — LIDOCAINE HYDROCHLORIDE,EPINEPHRINE BITARTRATE 5 ML: 20; .005 INJECTION, SOLUTION EPIDURAL; INFILTRATION; INTRACAUDAL; PERINEURAL at 18:20

## 2023-07-19 RX ADMIN — CEFAZOLIN 2 G: 1 INJECTION, POWDER, FOR SOLUTION INTRAMUSCULAR; INTRAVENOUS at 18:16

## 2023-07-19 RX ADMIN — BUPIVACAINE HYDROCHLORIDE 5 ML: 2.5 INJECTION, SOLUTION EPIDURAL; INFILTRATION; INTRACAUDAL at 06:01

## 2023-07-19 RX ADMIN — ONDANSETRON 4 MG: 2 INJECTION INTRAMUSCULAR; INTRAVENOUS at 18:12

## 2023-07-19 RX ADMIN — MORPHINE SULFATE 1.5 MG: 0.5 INJECTION, SOLUTION EPIDURAL; INTRATHECAL; INTRAVENOUS at 18:33

## 2023-07-19 RX ADMIN — LIDOCAINE HYDROCHLORIDE,EPINEPHRINE BITARTRATE 10 ML: 20; .005 INJECTION, SOLUTION EPIDURAL; INFILTRATION; INTRACAUDAL; PERINEURAL at 18:05

## 2023-07-19 RX ADMIN — ROPIVACAINE HYDROCHLORIDE: 2 INJECTION, SOLUTION EPIDURAL; INFILTRATION at 12:57

## 2023-07-19 RX ADMIN — FENTANYL CITRATE 100 MCG: 50 INJECTION, SOLUTION INTRAMUSCULAR; INTRAVENOUS at 04:27

## 2023-07-19 RX ADMIN — DEXAMETHASONE SODIUM PHOSPHATE 4 MG: 4 INJECTION INTRA-ARTICULAR; INTRALESIONAL; INTRAMUSCULAR; INTRAVENOUS; SOFT TISSUE at 18:12

## 2023-07-19 RX ADMIN — SODIUM CHLORIDE, POTASSIUM CHLORIDE, SODIUM LACTATE AND CALCIUM CHLORIDE: 600; 310; 30; 20 INJECTION, SOLUTION INTRAVENOUS at 01:04

## 2023-07-19 ASSESSMENT — PAIN DESCRIPTION - PAIN TYPE
TYPE: ACUTE PAIN

## 2023-07-19 NOTE — ANESTHESIA PROCEDURE NOTES
Epidural Block    Date/Time: 7/19/2023 5:53 AM    Performed by: León Castellanos M.D.  Authorized by: León Castellanos M.D.    Patient Location:  OB  Start Time:  7/19/2023 5:53 AM  End Time:  7/19/2023 6:01 AM  Reason for Block: labor analgesia    patient identified, IV checked, site marked, risks and benefits discussed, surgical consent, monitors and equipment checked and pre-op evaluation    Patient Position:  Sitting  Prep: ChloraPrep, patient draped and sterile technique    Monitoring:  Blood pressure, continuous pulse oximetry and heart rate  Approach:  Midline  Location:  L3-L4  Injection Technique:  ROSELYN saline  Skin infiltration:  Lidocaine  Strength:  1%  Dose:  3ml  Needle Type:  Tuohy  Needle Gauge:  17 G  Needle Length:  3.5 in  Loss of resistance::  5  Catheter Size:  19 G  Catheter at Skin Depth:  10  Test Dose Result:  Negative   Slight left trajectory

## 2023-07-19 NOTE — H&P
"History and Physical      Ariela Magallanes Soto is a 23 y.o. female  at 40w1d who presents for IOL secondary to prolonged pregnancy. Pt is bipolar and placed on meds midway through pregnancy. Denies CTXs, LOF or VB. Good FM    ROS: A comprehensive review of systems was negative.  PMH: Bipolar  History reviewed. No pertinent surgical history.  History reviewed. No pertinent family history.    Social History     Tobacco Use    Smoking status: Former     Packs/day: 0.00     Years: 4.00     Pack years: 0.00     Types: Cigarettes, Electronic Cigarettes     Start date: 2018     Quit date: 10/25/2022     Years since quittin.7    Smokeless tobacco: Never    Tobacco comments:     Would only use e-cigs. Started switching off to cigarettes at the beginning of .   Vaping Use    Vaping Use: Former    Quit date: 10/1/2022    Substances: Nicotine, Flavoring    Devices: Disposable, Pre-filled or refillable cartridge, Pre-filled pod   Substance Use Topics    Alcohol use: Not Currently     Alcohol/week: 4.8 oz     Types: 4 Cans of beer, 4 Shots of liquor per week     Comment: This was between 1-3 times a week    Drug use: Not Currently     Frequency: 1.0 times per week     Types: Marijuana, Cocaine     Comment: Cocaine last use 2022. Marijuana tried once around 2022     Allergies: Patient has no known allergies.    Objective:      /69   Pulse 80   Temp 36.8 °C (98.2 °F) (Temporal)   Ht 1.575 m (5' 2\")   Wt 72.6 kg (160 lb)     General:   no acute distress, alert, cooperative   Skin:   normal   HEENT:  Neck supple with midline trachea   Lungs:   CTA bilateral   Heart:   regular rate and rhythm   Abdomen:   gravid, NT   EFW:  6 lbs 10 oz   Pelvis:  adequate with gynecoid pelvis   FHT:  150s BPM   Uterine Size: S=D   Presentations: Cephalic   Cervix:     Dilation: 1cm    Effacement: 75%    Station:  -2    Consistency: Medium    Position: Posterior     Lab Review  Recent Results (from the past 24 " hour(s))   Hold Blood Bank Specimen (Not Tested)    Collection Time: 07/18/23  1:15 PM   Result Value Ref Range    Holding Tube - Bb DONE    CBC with differential    Collection Time: 07/18/23  1:15 PM   Result Value Ref Range    WBC 7.4 4.8 - 10.8 K/uL    RBC 3.47 (L) 4.20 - 5.40 M/uL    Hemoglobin 10.0 (L) 12.0 - 16.0 g/dL    Hematocrit 30.6 (L) 37.0 - 47.0 %    MCV 88.2 81.4 - 97.8 fL    MCH 28.8 27.0 - 33.0 pg    MCHC 32.7 32.2 - 35.5 g/dL    RDW 44.7 35.9 - 50.0 fL    Platelet Count 228 164 - 446 K/uL    MPV 11.9 9.0 - 12.9 fL    Neutrophils-Polys 76.80 (H) 44.00 - 72.00 %    Lymphocytes 14.40 (L) 22.00 - 41.00 %    Monocytes 6.40 0.00 - 13.40 %    Eosinophils 1.40 0.00 - 6.90 %    Basophils 0.50 0.00 - 1.80 %    Immature Granulocytes 0.50 0.00 - 0.90 %    Nucleated RBC 0.00 0.00 - 0.20 /100 WBC    Neutrophils (Absolute) 5.64 1.82 - 7.42 K/uL    Lymphs (Absolute) 1.06 1.00 - 4.80 K/uL    Monos (Absolute) 0.47 0.00 - 0.85 K/uL    Eos (Absolute) 0.10 0.00 - 0.51 K/uL    Baso (Absolute) 0.04 0.00 - 0.12 K/uL    Immature Granulocytes (abs) 0.04 0.00 - 0.11 K/uL    NRBC (Absolute) 0.00 K/uL   T.PALLIDUM AB YOBANY (Syphilis)    Collection Time: 07/18/23  1:15 PM   Result Value Ref Range    Syphilis, Treponemal Qual Non-Reactive Non-Reactive      Assessment:   Ariela Magallanes Soto at 40w1d  Labor status: IOL     Plan:     Admit to L&D  GBS negative  Cytotec follow by pitocin IOL  Epidural for pain if desired

## 2023-07-19 NOTE — PROGRESS NOTES
0700: Report from Rebecca MARIA RN. Dr. Poole at bedside. AROM and SVE, please refer to flow sheet documentation. Orders to start amnioinfusion with a 200 ml bolus then continue with a 75 ml/hr infusion rate.   1431: This RN called Dr. Poole to report change in fetal heart rate baseline. RN informed Dr. Poole that a 500 ml fluid bolus was given for elevated baseline, pitocin has been titrated down per orders, and RN has been assisting patient in position changes.Per MD, fetus has had a high baseline throughout pregnancy with HR in 160's on NSTs. No new orders received at this time. Dr. Poole aware of tracing.   1522: RN called Dr. Poole and requested MD to review fetal heart tracing. MD reviewed tracing. RN inquired with MD about starting patient on D5LR. Orders received. See MAR.   1626: RN updated Dr. Poole via phone. Pitocin turned off, upon palpation during contraction, UC strong with firm resting tone by this RN and CARLY Claudio RN. Pt reports feeling pain on right side of abdomen, SVE, and patient has a temp of 100.0 F orally. Tylenol given per orders, see MAR. RN reported fetal heart rate tracing and requested MD come to bedside to evaluate patient. MD informed RN she will be here within 30 minutes. This RN updated charge SHAZIA Martinez about patient status.   1728: Dr. Poole at bedside to evaluate patient. Pitocin restarted at 4mu per MD.   1730: Dr. Poole discussed risks and benefits of proceeding to labor and gave option for a  section, see provider note for details.  Patient opted for  section.Charge SHAZIA Martinez informed.   1808: out of  patient room  1809: In OR 2  1829: delivery of viable male infant born via  section. See provider delivery note.   1857: out of OR 2  1858: In PACU 3  1900: Report to JOEL Saucedo RN. Care relinquished.

## 2023-07-19 NOTE — PROGRESS NOTES
"Ariela Magallanes Soto at 40w2d admitted for IOL    Subjective: positive for CTXS.  positive and not tolerating well prior to epidural for feeling pain from CTXs. negative for LOF. negative for vaginal bleeding.   Pain is well controlled: yes with epidural. Desires epidural: yes.    /69   Pulse 80   Temp 36.8 °C (98.2 °F) (Temporal)   Ht 1.575 m (5' 2\")   Wt 72.6 kg (160 lb)     Physical exam  FHT reactive, with accelerations  SVE 4/80/-1  AROM yes with meconium fluid  CTXs Q 3 mins    Meds:     Epidural : .yes  Magnesium sulfate: .no  Pitocin: .yes at 8 mu    Lab:   Recent Results (from the past 24 hour(s))   Hold Blood Bank Specimen (Not Tested)    Collection Time: 23  1:15 PM   Result Value Ref Range    Holding Tube - Bb DONE    CBC with differential    Collection Time: 23  1:15 PM   Result Value Ref Range    WBC 7.4 4.8 - 10.8 K/uL    RBC 3.47 (L) 4.20 - 5.40 M/uL    Hemoglobin 10.0 (L) 12.0 - 16.0 g/dL    Hematocrit 30.6 (L) 37.0 - 47.0 %    MCV 88.2 81.4 - 97.8 fL    MCH 28.8 27.0 - 33.0 pg    MCHC 32.7 32.2 - 35.5 g/dL    RDW 44.7 35.9 - 50.0 fL    Platelet Count 228 164 - 446 K/uL    MPV 11.9 9.0 - 12.9 fL    Neutrophils-Polys 76.80 (H) 44.00 - 72.00 %    Lymphocytes 14.40 (L) 22.00 - 41.00 %    Monocytes 6.40 0.00 - 13.40 %    Eosinophils 1.40 0.00 - 6.90 %    Basophils 0.50 0.00 - 1.80 %    Immature Granulocytes 0.50 0.00 - 0.90 %    Nucleated RBC 0.00 0.00 - 0.20 /100 WBC    Neutrophils (Absolute) 5.64 1.82 - 7.42 K/uL    Lymphs (Absolute) 1.06 1.00 - 4.80 K/uL    Monos (Absolute) 0.47 0.00 - 0.85 K/uL    Eos (Absolute) 0.10 0.00 - 0.51 K/uL    Baso (Absolute) 0.04 0.00 - 0.12 K/uL    Immature Granulocytes (abs) 0.04 0.00 - 0.11 K/uL    NRBC (Absolute) 0.00 K/uL   T.PALLIDUM AB YOBANY (Syphilis)    Collection Time: 23  1:15 PM   Result Value Ref Range    Syphilis, Treponemal Qual Non-Reactive Non-Reactive       A/P  Celia Parkeranes Marcial is 23 y.o.  at 40w2d  Amnioinfusion " for meconium

## 2023-07-19 NOTE — CARE PLAN
The patient is Stable - Low risk of patient condition declining or worsening    Shift Goals  Clinical Goals: cervical change  Patient Goals: healthy baby, healthy mom  Family Goals: support    Progress made toward(s) clinical / shift goals:        Problem: Knowledge Deficit - L&D  Goal: Patient and family/caregivers will demonstrate understanding of plan of care, disease process/condition, diagnostic tests and medications  Outcome: Progressing     Problem: Risk for Excess Fluid Volume  Goal: Patient will demonstrate pulse, blood pressure and neurologic signs within expected ranges and without any respiratory complications  Outcome: Progressing     Problem: Psychosocial - L&D  Goal: Patient's level of anxiety will decrease  Outcome: Progressing  Goal: Patient will be able to discuss coping skills during hospitalization  Outcome: Progressing  Goal: Patient's ability to re-evaluate and adapt role responsibilities will improve  Outcome: Progressing  Goal: Spiritual and cultural needs incorporated into hospitalization  Outcome: Progressing     Problem: Risk for Venous Thromboembolism (VTE)  Goal: VTE prevention measures will be implemented and patient will remain free from VTE  Outcome: Progressing     Problem: Risk for Infection and Impaired Wound Healing  Goal: Patient will remain free from infection  Outcome: Progressing  Goal: Patient's wound/surgical incision will decrease in size and heals properly  Outcome: Progressing     Problem: Risk for Fluid Imbalance  Goal: Patient's fluid volume balance will be maintained or improve  Outcome: Progressing     Problem: Risk for Injury  Goal: Patient and fetus will be free of preventable injury/complications  Outcome: Progressing     Problem: Pain  Goal: Patient's pain will be alleviated or reduced to the patient’s comfort goal  Outcome: Progressing   Patient remains comfortable with epidural in place and infusing.   Problem: Discharge Barriers/Planning  Goal: Patient's  continuum of care needs are met  Outcome: Progressing

## 2023-07-19 NOTE — CARE PLAN
Problem: Risk for Injury  Goal: Patient and fetus will be free of preventable injury/complications  Outcome: Progressing  Note: Continuous fetal heart rate and uterine contraction monitoring in place.      Problem: Pain  Goal: Patient's pain will be alleviated or reduced to the patient’s comfort goal  Outcome: Progressing  Note: Patient does not desire an epidural at this time; education provided. If patient has new onset pain they will notify this RN.     The patient is Stable - Low risk of patient condition declining or worsening    Shift Goals  Clinical Goals: cervical change  Patient Goals: pain management  Family Goals: support    Progress made toward(s) clinical / shift goals:  Progressing

## 2023-07-19 NOTE — PROGRESS NOTES
1900: Bedside report received from Colette MCCRAY. Plan of care discussed; care assumed.    1930: SVE 1-2/80/-2. Dr. Poole updated on patient status; new orders received to start induction pitocin if contractions space out; patient educated.    2120: Patient called out to this RN in pain; birthing ball and heat pack provided. Pain management techniques discussed.    2252: This RN to bedside to start induction pitocin, patient refused at this time.     2330: Patient called out in pain; SVE 2/80/-2. Dr. Poole notified of patient labor status and pitocin refusal; new orders received.     0100: Patient called out in pain; plan of care discussed. Patient requesting pain medication and agreeable to starting pitocin at this time.     0320: Patient called out in pain; this RN not able to come to bedside so Abida RN to bedside to administer fentanyl.    0420: Patient called out in pain; this RN to bedside for dose of fentanyl; SVE 4/80/-2.    0526: Patient called out in pain; requesting an epidural at this time.    0532: Dr. Castellanos notified of patient desire for an epidural.    0550: Dr. Castellanos to bedside for epidural placement; time out completed, all agree.    0700: Bedside report given to Michelle MCCRAY. Plan of care discussed; care relinquished at this time.

## 2023-07-20 LAB
ERYTHROCYTE [DISTWIDTH] IN BLOOD BY AUTOMATED COUNT: 46.1 FL (ref 35.9–50)
HCT VFR BLD AUTO: 26.7 % (ref 37–47)
HGB BLD-MCNC: 8.9 G/DL (ref 12–16)
MCH RBC QN AUTO: 28.4 PG (ref 27–33)
MCHC RBC AUTO-ENTMCNC: 33.3 G/DL (ref 32.2–35.5)
MCV RBC AUTO: 85.3 FL (ref 81.4–97.8)
PLATELET # BLD AUTO: 142 K/UL (ref 164–446)
PMV BLD AUTO: 12.1 FL (ref 9–12.9)
RBC # BLD AUTO: 3.13 M/UL (ref 4.2–5.4)
WBC # BLD AUTO: 17.6 K/UL (ref 4.8–10.8)

## 2023-07-20 PROCEDURE — 700102 HCHG RX REV CODE 250 W/ 637 OVERRIDE(OP): Performed by: OBSTETRICS & GYNECOLOGY

## 2023-07-20 PROCEDURE — 36415 COLL VENOUS BLD VENIPUNCTURE: CPT

## 2023-07-20 PROCEDURE — 770002 HCHG ROOM/CARE - OB PRIVATE (112)

## 2023-07-20 PROCEDURE — 700102 HCHG RX REV CODE 250 W/ 637 OVERRIDE(OP): Performed by: ANESTHESIOLOGY

## 2023-07-20 PROCEDURE — A9270 NON-COVERED ITEM OR SERVICE: HCPCS | Performed by: OBSTETRICS & GYNECOLOGY

## 2023-07-20 PROCEDURE — A9270 NON-COVERED ITEM OR SERVICE: HCPCS | Performed by: ANESTHESIOLOGY

## 2023-07-20 PROCEDURE — 700111 HCHG RX REV CODE 636 W/ 250 OVERRIDE (IP): Mod: JZ | Performed by: ANESTHESIOLOGY

## 2023-07-20 PROCEDURE — 85027 COMPLETE CBC AUTOMATED: CPT

## 2023-07-20 RX ORDER — DIPHENHYDRAMINE HYDROCHLORIDE 50 MG/ML
12.5 INJECTION INTRAMUSCULAR; INTRAVENOUS EVERY 6 HOURS PRN
Status: ACTIVE | OUTPATIENT
Start: 2023-07-20 | End: 2023-07-21

## 2023-07-20 RX ORDER — OXYCODONE HYDROCHLORIDE 5 MG/1
5 TABLET ORAL EVERY 4 HOURS PRN
Status: DISCONTINUED | OUTPATIENT
Start: 2023-07-20 | End: 2023-07-20

## 2023-07-20 RX ORDER — OXYCODONE HYDROCHLORIDE 5 MG/1
5 TABLET ORAL EVERY 4 HOURS PRN
Status: DISCONTINUED | OUTPATIENT
Start: 2023-07-21 | End: 2023-07-22 | Stop reason: HOSPADM

## 2023-07-20 RX ORDER — OXYCODONE HYDROCHLORIDE 10 MG/1
10 TABLET ORAL EVERY 4 HOURS PRN
Status: DISPENSED | OUTPATIENT
Start: 2023-07-20 | End: 2023-07-21

## 2023-07-20 RX ORDER — HYDROMORPHONE HYDROCHLORIDE 1 MG/ML
0.2 INJECTION, SOLUTION INTRAMUSCULAR; INTRAVENOUS; SUBCUTANEOUS
Status: ACTIVE | OUTPATIENT
Start: 2023-07-20 | End: 2023-07-21

## 2023-07-20 RX ORDER — SIMETHICONE 125 MG
125 TABLET,CHEWABLE ORAL 4 TIMES DAILY PRN
Status: DISCONTINUED | OUTPATIENT
Start: 2023-07-20 | End: 2023-07-22 | Stop reason: HOSPADM

## 2023-07-20 RX ORDER — KETOROLAC TROMETHAMINE 30 MG/ML
30 INJECTION, SOLUTION INTRAMUSCULAR; INTRAVENOUS EVERY 6 HOURS
Status: DISCONTINUED | OUTPATIENT
Start: 2023-07-20 | End: 2023-07-20

## 2023-07-20 RX ORDER — KETOROLAC TROMETHAMINE 30 MG/ML
30 INJECTION, SOLUTION INTRAMUSCULAR; INTRAVENOUS EVERY 6 HOURS
Status: COMPLETED | OUTPATIENT
Start: 2023-07-20 | End: 2023-07-20

## 2023-07-20 RX ORDER — DIPHENHYDRAMINE HYDROCHLORIDE 50 MG/ML
25 INJECTION INTRAMUSCULAR; INTRAVENOUS EVERY 6 HOURS PRN
Status: ACTIVE | OUTPATIENT
Start: 2023-07-20 | End: 2023-07-21

## 2023-07-20 RX ORDER — ONDANSETRON 2 MG/ML
4 INJECTION INTRAMUSCULAR; INTRAVENOUS EVERY 6 HOURS PRN
Status: ACTIVE | OUTPATIENT
Start: 2023-07-20 | End: 2023-07-21

## 2023-07-20 RX ORDER — VITAMIN A ACETATE, BETA CAROTENE, ASCORBIC ACID, CHOLECALCIFEROL, .ALPHA.-TOCOPHEROL ACETATE, DL-, THIAMINE MONONITRATE, RIBOFLAVIN, NIACINAMIDE, PYRIDOXINE HYDROCHLORIDE, FOLIC ACID, CYANOCOBALAMIN, CALCIUM CARBONATE, FERROUS FUMARATE, ZINC OXIDE, CUPRIC OXIDE 3080; 12; 120; 400; 1; 1.84; 3; 20; 22; 920; 25; 200; 27; 10; 2 [IU]/1; UG/1; MG/1; [IU]/1; MG/1; MG/1; MG/1; MG/1; MG/1; [IU]/1; MG/1; MG/1; MG/1; MG/1; MG/1
1 TABLET, FILM COATED ORAL
Status: DISCONTINUED | OUTPATIENT
Start: 2023-07-20 | End: 2023-07-22 | Stop reason: HOSPADM

## 2023-07-20 RX ORDER — DOCUSATE SODIUM 100 MG/1
100 CAPSULE, LIQUID FILLED ORAL 2 TIMES DAILY PRN
Status: DISCONTINUED | OUTPATIENT
Start: 2023-07-20 | End: 2023-07-22 | Stop reason: HOSPADM

## 2023-07-20 RX ORDER — FERROUS SULFATE 325(65) MG
325 TABLET ORAL 2 TIMES DAILY WITH MEALS
Status: DISCONTINUED | OUTPATIENT
Start: 2023-07-20 | End: 2023-07-22 | Stop reason: HOSPADM

## 2023-07-20 RX ORDER — SODIUM CHLORIDE, SODIUM LACTATE, POTASSIUM CHLORIDE, CALCIUM CHLORIDE 600; 310; 30; 20 MG/100ML; MG/100ML; MG/100ML; MG/100ML
INJECTION, SOLUTION INTRAVENOUS PRN
Status: DISCONTINUED | OUTPATIENT
Start: 2023-07-20 | End: 2023-07-22 | Stop reason: HOSPADM

## 2023-07-20 RX ORDER — OXYCODONE HYDROCHLORIDE 10 MG/1
10 TABLET ORAL EVERY 4 HOURS PRN
Status: DISCONTINUED | OUTPATIENT
Start: 2023-07-20 | End: 2023-07-20

## 2023-07-20 RX ORDER — BISACODYL 10 MG
10 SUPPOSITORY, RECTAL RECTAL PRN
Status: DISCONTINUED | OUTPATIENT
Start: 2023-07-20 | End: 2023-07-22 | Stop reason: HOSPADM

## 2023-07-20 RX ORDER — ACETAMINOPHEN 500 MG
1000 TABLET ORAL EVERY 6 HOURS PRN
Status: DISCONTINUED | OUTPATIENT
Start: 2023-07-24 | End: 2023-07-22 | Stop reason: HOSPADM

## 2023-07-20 RX ORDER — ACETAMINOPHEN 500 MG
1000 TABLET ORAL EVERY 6 HOURS
Status: DISPENSED | OUTPATIENT
Start: 2023-07-20 | End: 2023-07-21

## 2023-07-20 RX ORDER — ONDANSETRON 2 MG/ML
4 INJECTION INTRAMUSCULAR; INTRAVENOUS EVERY 6 HOURS PRN
Status: DISCONTINUED | OUTPATIENT
Start: 2023-07-21 | End: 2023-07-22 | Stop reason: HOSPADM

## 2023-07-20 RX ORDER — OXYCODONE HYDROCHLORIDE 5 MG/1
5 TABLET ORAL EVERY 4 HOURS PRN
Status: ACTIVE | OUTPATIENT
Start: 2023-07-20 | End: 2023-07-21

## 2023-07-20 RX ORDER — IBUPROFEN 800 MG/1
800 TABLET ORAL EVERY 8 HOURS PRN
Status: DISCONTINUED | OUTPATIENT
Start: 2023-07-24 | End: 2023-07-22 | Stop reason: HOSPADM

## 2023-07-20 RX ORDER — IBUPROFEN 800 MG/1
800 TABLET ORAL EVERY 8 HOURS
Status: DISCONTINUED | OUTPATIENT
Start: 2023-07-21 | End: 2023-07-22 | Stop reason: HOSPADM

## 2023-07-20 RX ORDER — HYDROMORPHONE HYDROCHLORIDE 1 MG/ML
0.4 INJECTION, SOLUTION INTRAMUSCULAR; INTRAVENOUS; SUBCUTANEOUS
Status: ACTIVE | OUTPATIENT
Start: 2023-07-20 | End: 2023-07-21

## 2023-07-20 RX ORDER — DIPHENHYDRAMINE HYDROCHLORIDE 50 MG/ML
25 INJECTION INTRAMUSCULAR; INTRAVENOUS EVERY 6 HOURS PRN
Status: DISCONTINUED | OUTPATIENT
Start: 2023-07-21 | End: 2023-07-22 | Stop reason: HOSPADM

## 2023-07-20 RX ORDER — DIPHENHYDRAMINE HCL 25 MG
25 TABLET ORAL EVERY 6 HOURS PRN
Status: DISCONTINUED | OUTPATIENT
Start: 2023-07-21 | End: 2023-07-22 | Stop reason: HOSPADM

## 2023-07-20 RX ORDER — CALCIUM CARBONATE 500 MG/1
1000 TABLET, CHEWABLE ORAL EVERY 6 HOURS PRN
Status: DISCONTINUED | OUTPATIENT
Start: 2023-07-20 | End: 2023-07-22 | Stop reason: HOSPADM

## 2023-07-20 RX ORDER — EPHEDRINE SULFATE 50 MG/ML
10 INJECTION, SOLUTION INTRAVENOUS
Status: ACTIVE | OUTPATIENT
Start: 2023-07-20 | End: 2023-07-21

## 2023-07-20 RX ORDER — OXYCODONE HYDROCHLORIDE 10 MG/1
10 TABLET ORAL EVERY 4 HOURS PRN
Status: DISCONTINUED | OUTPATIENT
Start: 2023-07-21 | End: 2023-07-22 | Stop reason: HOSPADM

## 2023-07-20 RX ORDER — ACETAMINOPHEN 500 MG
1000 TABLET ORAL EVERY 6 HOURS
Status: DISCONTINUED | OUTPATIENT
Start: 2023-07-21 | End: 2023-07-22 | Stop reason: HOSPADM

## 2023-07-20 RX ORDER — ONDANSETRON 4 MG/1
4 TABLET, ORALLY DISINTEGRATING ORAL EVERY 6 HOURS PRN
Status: DISCONTINUED | OUTPATIENT
Start: 2023-07-21 | End: 2023-07-22 | Stop reason: HOSPADM

## 2023-07-20 RX ADMIN — FERROUS SULFATE TAB 325 MG (65 MG ELEMENTAL FE) 325 MG: 325 (65 FE) TAB at 21:45

## 2023-07-20 RX ADMIN — OXYCODONE HYDROCHLORIDE 10 MG: 10 TABLET ORAL at 05:17

## 2023-07-20 RX ADMIN — ACETAMINOPHEN 1000 MG: 500 TABLET, FILM COATED ORAL at 02:53

## 2023-07-20 RX ADMIN — KETOROLAC TROMETHAMINE 30 MG: 30 INJECTION, SOLUTION INTRAMUSCULAR; INTRAVENOUS at 19:53

## 2023-07-20 RX ADMIN — ACETAMINOPHEN 1000 MG: 500 TABLET, FILM COATED ORAL at 19:52

## 2023-07-20 RX ADMIN — ACETAMINOPHEN 1000 MG: 500 TABLET, FILM COATED ORAL at 08:55

## 2023-07-20 RX ADMIN — KETOROLAC TROMETHAMINE 30 MG: 30 INJECTION, SOLUTION INTRAMUSCULAR; INTRAVENOUS at 14:24

## 2023-07-20 RX ADMIN — KETOROLAC TROMETHAMINE 30 MG: 30 INJECTION, SOLUTION INTRAMUSCULAR; INTRAVENOUS at 02:53

## 2023-07-20 RX ADMIN — PRENATAL WITH FERROUS FUM AND FOLIC ACID 1 TABLET: 3080; 920; 120; 400; 22; 1.84; 3; 20; 10; 1; 12; 200; 27; 25; 2 TABLET ORAL at 08:55

## 2023-07-20 RX ADMIN — ACETAMINOPHEN 1000 MG: 500 TABLET, FILM COATED ORAL at 14:26

## 2023-07-20 RX ADMIN — KETOROLAC TROMETHAMINE 30 MG: 30 INJECTION, SOLUTION INTRAMUSCULAR; INTRAVENOUS at 08:55

## 2023-07-20 ASSESSMENT — PAIN DESCRIPTION - PAIN TYPE
TYPE: SURGICAL PAIN
TYPE: SURGICAL PAIN
TYPE: ACUTE PAIN

## 2023-07-20 ASSESSMENT — PAIN SCALES - GENERAL: PAIN_LEVEL: 2

## 2023-07-20 NOTE — CARE PLAN
The patient is Stable - Low risk of patient condition declining or worsening    Shift Goals  Clinical Goals: maintain vitals  Patient Goals: healthy baby, healthy mom  Family Goals: support    Progress made toward(s) clinical / shift goals:  Pain adequately controlled with current meds    Patient is not progressing towards the following goals:

## 2023-07-20 NOTE — PROGRESS NOTES
1900: Received report from Michelle MCCRAY, at bedside, plan of care discussed. Pt just arrived to PACU. Plan to recover and transfer up to PP.    1938:Dr. Black and Dr. Poole along with Kathie Spaulding RN, was called to bedside due to Low BP and pts blood loss. Hemorrhage cart brought to bedside, pads being weighed. Second IV started, Hemorrhage meds given see MAR. Orders received. IV fluid bolus started, PP Pit bolused as well. Pt stable and Bleeding has slowed down, fundus is firm and back below umbilicus. No orders for additional Pit past the last PP bag unless bleeding increases again. Plan to wait for Labs to result before sending pt up to PP.     2110: Dr. Poole notified of lab results, Orders for blood transfusion, 2 units. Plan to keep pt down in PACU for transfusions.     0000: Dr. Poole at bedside to check up on pt. Pt bleeding is stable along with vitals, pt verbalizing she feels much better as well. Orders to finish last blood transfusion and then transfer pt up to PP if bleeding is still stable. Orders for repeat lab draw for H&H to be with normal morning labs.     0120: Pt finished with Blood transfusions. Pt transferred safely up to PP with viable male infant in arms. Report given to Priya MCCRAY at bedside, plan of care discussed.

## 2023-07-20 NOTE — ANESTHESIA TIME REPORT
Anesthesia Start and Stop Event Times     Date Time Event    7/19/2023 0549 Ready for Procedure     0549 Anesthesia Start     1902 Anesthesia Stop        Responsible Staff  07/19/23    Name Role Begin End    León Castellanos M.D. Anesth 0549 [id number]    Karl Black M.D. Anesth 0702 1902        Overtime Reason:  no overtime (within assigned shift)    Comments:

## 2023-07-20 NOTE — ANESTHESIA POSTPROCEDURE EVALUATION
Patient: Ariela Magallanes Marcial    Procedure Summary     Date: 23 Room / Location: [facility] OR 02 / SURGERY LABOR AND DELIVERY    Anesthesia Start: 549 Anesthesia Stop:     Procedure:  SECTION, PRIMARY (Bilateral: Abdomen) Diagnosis: (same, del)    Surgeons: Hollie Poole M.D. Responsible Provider: Karl Black M.D.    Anesthesia Type: epidural ASA Status: 2          Final Anesthesia Type: epidural  Last vitals  BP   Blood Pressure: 133/67    Temp   36.6 °C (97.8 °F)    Pulse   74   Resp   16    SpO2   92 %      Anesthesia Post Evaluation    Patient location during evaluation: PACU  Patient participation: complete - patient participated  Level of consciousness: awake and alert  Pain score: 2    Airway patency: patent  Anesthetic complications: no  Cardiovascular status: hemodynamically stable  Respiratory status: acceptable  Hydration status: euvolemic    PONV: none          No notable events documented.

## 2023-07-20 NOTE — LACTATION NOTE
This note was copied from a baby's chart.  Mom is a 24 y/o P1 who delivered baby boy weighing 8 # 9.2 oz at 40.2 wks.Mom reports breast changes during pregnancy. Mom denies any  diabetes, thyroid or fertility issues. Mom had a postpartum hemorrhage and was transfused with 2 units. Mom gave bottle since delivery but attempted to feed about 11:00 with help of bedside RN. LC came to room and educated mom , FOB and family regarding feeds. LC explained the supplemental guidelines according to DOL. LC encouraged mom to call for next feeding time for latch assist. Reviewed demand feeds of 8 or more times in 24 hours, keep baby STS during waking hours and observing for output.   Mom had no questions regarding any of the education.LC lactation will follow up with mom before discharge

## 2023-07-20 NOTE — PROGRESS NOTES
23 y.o.  @ 40w2d presented for IOL secondary to postdates and was started on Cytotec followed by pitocin IOL. Pt however has had protracted labor and has made no cervical change x 4 hours. Fetus did have episode of tachycardia and pitocin had to be stopped. Pt also has a borderline pelvis. Options given to retry pitocin IOL and see if baby can tolerate or proceed with . Pt opts for C/S.    A/P  TIUP @ 40w2d  FTP  Meconium    Discussed with the patient indications for  delivery. The patient voiced understanding of indications for  section at this time.    Discussed with the patient the risks of  delivery. The risks include infection, bleeding, scarring, damage to other organs in the area of operation. Specifically organs that can be damaged are bowel, bladder, ureters. I also discussed with the patient the risk of wound infection and wound breakdown. We discussed that these risks are greater in people that have diabetes or obesity. I also discussed the risk of emergency blood transfusion during procedure as well as emergency hysterectomy during procedure.    Patient had the opportunity to ask questions regarding procedures. All questions answered to the patient's satisfaction.    Proceed with  delivery

## 2023-07-20 NOTE — PROGRESS NOTES
"Pt reports feeling better with blood transfusion. Is hungry and would like to have something to eat. Has tolerated liquids. Feels bleeding is better. Pain is controlled.    /72   Pulse 77   Temp 36.6 °C (97.8 °F) (Temporal)   Resp 17   Ht 1.575 m (5' 2\")   Wt 72.6 kg (160 lb)   SpO2 95%      Abd BS+, ND, soft, NT x 4  Incision C/D/I  Fundus is firm and at the umbilicus  Minimal to no VB currently  Ext NT    Recent Labs     07/18/23  1315 07/19/23  1950   WBC 7.4 19.9*   RBC 3.47* 2.28*   HEMOGLOBIN 10.0* 6.6*   HEMATOCRIT 30.6* 20.7*   MCV 88.2 90.8   MCH 28.8 28.9   RDW 44.7 45.8   PLATELETCT 228 168   MPV 11.9 12.4   NEUTSPOLYS 76.80* 91.30*   LYMPHOCYTES 14.40* 3.70*   MONOCYTES 6.40 4.30   EOSINOPHILS 1.40 0.00   BASOPHILS 0.50 0.20     A/P  PPH  Post C/S   - Pt is getting 2nd unit of PRBC  - CBC 4 hours after 2nd bag is completed  - Start clear liquids and can advance slowly aftr 2nd bag of PRBC is transfused  - Watch bleeding  - Good urine output  "

## 2023-07-20 NOTE — PROGRESS NOTES
Called to see patient in PACU for uterine bleeding.    Uterus palpated and noted to be soft. Bimanual exam performed and 500 cc of blood clots evacuated. Massage performed and now uterus is firm and at the umbilicus. 280 cc evacuated by RN.    Methergine and cytotec given    CBC and clotting factors ordered    Type and screen 2 units in case of severe anemai

## 2023-07-20 NOTE — PROGRESS NOTES
Patient ambulated to bathroom with standby assistance. Denies light headedness, dizziness, leg numbness or tingling. Oro removed.

## 2023-07-20 NOTE — CARE PLAN
The patient is Stable - Low risk of patient condition declining or worsening    Shift Goals  Clinical Goals: maintain vitals  Patient Goals: healthy baby, healthy mom  Family Goals: support    Progress made toward(s) clinical / shift goals:      Problem: Infection - Postpartum  Goal: Postpartum patient will be free of signs and symptoms of infection  Outcome: Progressing  Note: Patient remains free from signs and symptoms of infection, vital signs stable.       Problem: Respiratory/Oxygenation Function Post-Surgical  Goal: Patient will achieve/maintain normal respiratory rate/effort  Outcome: Progressing  Note: Patient remains free from signs and symptoms of respiratory distress.

## 2023-07-20 NOTE — PROGRESS NOTES
Admitted patient from Labor and Delivery, oriented patient to room, including call light, emergency call light, television, bed controls,and lights. Plan of care discussed and patient verbalized understanding. Assessment completed, fundus firm and lochia light. Abdominal incision with clear dressing intact. Patient will call if PRN pain medication intervention needed.

## 2023-07-20 NOTE — OP REPORT
DATE OF SERVICE:  2023     PREOPERATIVE DIAGNOSES:  1.  Term intrauterine pregnancy at 40 and 2/7 weeks.  2.  Fetal intolerance to labor.  3.  Failure to progress.  4.  Meconium-stained amniotic fluid.     POSTOPERATIVE DIAGNOSES:  1.  Term intrauterine pregnancy at 40 and 2/7 weeks.  2.  Fetal intolerance to labor.  3.  Failure to progress.  4.  Meconium-stained amniotic fluid.     PROCEDURE PERFORMED:  Primary low transverse  section.     SURGEON:  Hollie oPole MD     ASSISTANT:  Jennifer Saunders MD     ANESTHESIA:  Epidural.     ANESTHESIOLOGIST:  Karl Black MD     ESTIMATED BLOOD LOSS:  600 mL.     FINDINGS:  A viable male infant in a direct OP presentation with Apgars of 8   and 9, weight of 8 pounds 9 ounces with very atonic boggy uterus; otherwise,   normal tubes and ovaries bilaterally.     DESCRIPTION OF PROCEDURE:  The patient was taken to the operating room where   epidural anesthesia was bolused.  The patient was prepped and draped in the   usual sterile manner and a formal time-out was called with IV antibiotics   given of both Ancef and azithromycin.  The patient was then tested and the   anesthetic agent was effective.  A Pfannenstiel incision was then made with a   knife down to the rectus fascia.  The rectus fascia was  from the   underlying rectus muscle first superiorly, then inferiorly.  The rectus muscle   was  sharply in the midline.  The peritoneum was tented up and   entered with Metzenbaum scissors and incision was extended with care to avoid   injury to underlying bowel or bladder.  A 4 cm incision was made in the lower   uterine segment transversely and incision was extended bluntly.  Amniotomy was   performed and there was gross amount of amniotic fluid secondary to the   amnioinfusion that was performed earlier and there was still noted to be   meconium-stained amniotic fluid.  The infant was in a direct OP presentation   and the head was guided  towards the hysterotomy incision and delivered.  The   mouth and nares suctioned.  The remainder of infant delivered without   complications.  The cord was doubly clamped and cut after a 30-second delay   and the infant was handed off to awaiting neonatology team.  The placenta was   then manually extracted.  Fragments of membranes were removed.  The uterus was   noted to be very boggy despite being given Pitocin and uterine massage was   first performed for approximately 3 minutes and at that point, the uterus   became slightly better but was still noted to be boggy.  The hysterotomy   incision was then approximated with 0 chromic in a running locking stitch x1.    Hemostasis was noted.  The uterus was further massaged and at this point was   finally noted to be firm.  The muscle and peritoneum was approximated with   four sutures of 0 chromic.  The fascia was approximated with #1 Vicryl.  The   subcutaneous fat was irrigated, small bleeders were bovied.  The subcutaneous   fat was approximated with three interrupted sutures of #1 Vicryl.  The skin   was approximated with Insorb subcuticular staples.  Dermabond glue was placed   over this incision followed by Prineo mesh and then, Tegaderm was used for   dressing.  Sponge, needle, instrument and lap counts were correct x2.  The   patient tolerated the procedure well and went to recovery room in stable   condition.  However, prior to the patient being sent to the recovery room,   another massage of the uterus was performed and there was noted to be   approximately 100 mL of blood clots that were removed from the uterine cavity   and vagina, making the EBL total of 600 mL.        ______________________________  MD SOFIA Aguayo/MAYLIN    DD:  07/19/2023 22:16  DT:  07/19/2023 22:47    Job#:  [id number]

## 2023-07-21 PROCEDURE — A9270 NON-COVERED ITEM OR SERVICE: HCPCS | Performed by: OBSTETRICS & GYNECOLOGY

## 2023-07-21 PROCEDURE — 700102 HCHG RX REV CODE 250 W/ 637 OVERRIDE(OP): Performed by: OBSTETRICS & GYNECOLOGY

## 2023-07-21 PROCEDURE — 770002 HCHG ROOM/CARE - OB PRIVATE (112)

## 2023-07-21 RX ADMIN — IBUPROFEN 800 MG: 800 TABLET, FILM COATED ORAL at 02:35

## 2023-07-21 RX ADMIN — ACETAMINOPHEN 1000 MG: 500 TABLET, FILM COATED ORAL at 14:22

## 2023-07-21 RX ADMIN — ACETAMINOPHEN 1000 MG: 500 TABLET, FILM COATED ORAL at 08:59

## 2023-07-21 RX ADMIN — OXYCODONE HYDROCHLORIDE 5 MG: 5 TABLET ORAL at 08:59

## 2023-07-21 RX ADMIN — IBUPROFEN 800 MG: 800 TABLET, FILM COATED ORAL at 10:40

## 2023-07-21 RX ADMIN — PRENATAL WITH FERROUS FUM AND FOLIC ACID 1 TABLET: 3080; 920; 120; 400; 22; 1.84; 3; 20; 10; 1; 12; 200; 27; 25; 2 TABLET ORAL at 07:27

## 2023-07-21 RX ADMIN — FERROUS SULFATE TAB 325 MG (65 MG ELEMENTAL FE) 325 MG: 325 (65 FE) TAB at 17:53

## 2023-07-21 RX ADMIN — ACETAMINOPHEN 1000 MG: 500 TABLET, FILM COATED ORAL at 20:51

## 2023-07-21 RX ADMIN — OXYCODONE HYDROCHLORIDE 5 MG: 5 TABLET ORAL at 20:51

## 2023-07-21 RX ADMIN — IBUPROFEN 800 MG: 800 TABLET, FILM COATED ORAL at 17:53

## 2023-07-21 RX ADMIN — ACETAMINOPHEN 1000 MG: 500 TABLET, FILM COATED ORAL at 02:35

## 2023-07-21 RX ADMIN — FERROUS SULFATE TAB 325 MG (65 MG ELEMENTAL FE) 325 MG: 325 (65 FE) TAB at 07:27

## 2023-07-21 ASSESSMENT — PAIN DESCRIPTION - PAIN TYPE
TYPE: ACUTE PAIN
TYPE: ACUTE PAIN

## 2023-07-21 NOTE — PROGRESS NOTES
"Ariela Magallanes Soto PP day 2 POD 2    Subjective: Abdominal pain. minimal, ambulating .yes, tolerating liquids .yes, tolerating regular diet .yes, flatus.yes, BM .yes, Bleeding .light, voiding .yes,dizziness .no, breast feeding.yes, breast tenderness .yes    /73   Pulse 68   Temp 36.9 °C (98.5 °F) (Temporal)   Resp 17   Ht 1.575 m (5' 2\")   Wt 72.6 kg (160 lb)   SpO2 95%   Breast Exam: Tenderness .no, Engourgement .no, Mastitis .no  Abdomen soft, non-tender. BS normal. No masses,  No organomegaly  Incision: dry and intact  Fundus Tenderness:no, Below umbilicus:Yes, firm  Perineumperineum intact  ExtremitiesNormal    Meds:   No current facility-administered medications on file prior to encounter.     Current Outpatient Medications on File Prior to Encounter   Medication Sig Dispense Refill    escitalopram (LEXAPRO) 20 MG tablet Take 20 mg by mouth every day. Taking 10 mg day      albuterol 108 (90 Base) MCG/ACT Aero Soln inhalation aerosol Inhale 1-2 Puffs every 6 hours as needed for Shortness of Breath. 8.5 g 0       Lab:   Recent Results (from the past 48 hour(s))   CBC WITH DIFFERENTIAL    Collection Time: 07/19/23  7:50 PM   Result Value Ref Range    WBC 19.9 (H) 4.8 - 10.8 K/uL    RBC 2.28 (L) 4.20 - 5.40 M/uL    Hemoglobin 6.6 (L) 12.0 - 16.0 g/dL    Hematocrit 20.7 (L) 37.0 - 47.0 %    MCV 90.8 81.4 - 97.8 fL    MCH 28.9 27.0 - 33.0 pg    MCHC 31.9 (L) 32.2 - 35.5 g/dL    RDW 45.8 35.9 - 50.0 fL    Platelet Count 168 164 - 446 K/uL    MPV 12.4 9.0 - 12.9 fL    Neutrophils-Polys 91.30 (H) 44.00 - 72.00 %    Lymphocytes 3.70 (L) 22.00 - 41.00 %    Monocytes 4.30 0.00 - 13.40 %    Eosinophils 0.00 0.00 - 6.90 %    Basophils 0.20 0.00 - 1.80 %    Immature Granulocytes 0.50 0.00 - 0.90 %    Nucleated RBC 0.00 0.00 - 0.20 /100 WBC    Neutrophils (Absolute) 18.20 (H) 1.82 - 7.42 K/uL    Lymphs (Absolute) 0.73 (L) 1.00 - 4.80 K/uL    Monos (Absolute) 0.86 (H) 0.00 - 0.85 K/uL    Eos (Absolute) 0.00 0.00 " - 0.51 K/uL    Baso (Absolute) 0.04 0.00 - 0.12 K/uL    Immature Granulocytes (abs) 0.09 0.00 - 0.11 K/uL    NRBC (Absolute) 0.00 K/uL   Comp Metabolic Panel    Collection Time: 07/19/23  7:50 PM   Result Value Ref Range    Sodium 135 135 - 145 mmol/L    Potassium 4.0 3.6 - 5.5 mmol/L    Chloride 103 96 - 112 mmol/L    Co2 19 (L) 20 - 33 mmol/L    Anion Gap 13.0 7.0 - 16.0    Glucose 116 (H) 65 - 99 mg/dL    Bun 10 8 - 22 mg/dL    Creatinine 1.25 0.50 - 1.40 mg/dL    Calcium 7.3 (L) 8.5 - 10.5 mg/dL    AST(SGOT) 18 12 - 45 U/L    ALT(SGPT) 12 2 - 50 U/L    Alkaline Phosphatase 132 (H) 30 - 99 U/L    Total Bilirubin 0.2 0.1 - 1.5 mg/dL    Albumin 2.4 (L) 3.2 - 4.9 g/dL    Total Protein 4.2 (L) 6.0 - 8.2 g/dL    Globulin 1.8 (L) 1.9 - 3.5 g/dL    A-G Ratio 1.3 g/dL   CORRECTED CALCIUM    Collection Time: 07/19/23  7:50 PM   Result Value Ref Range    Correct Calcium 8.6 8.5 - 10.5 mg/dL   ESTIMATED GFR    Collection Time: 07/19/23  7:50 PM   Result Value Ref Range    GFR (CKD-EPI) 61 >60 mL/min/1.73 m 2   CBC without differential- Once in AM regardless of delivery time    Collection Time: 07/20/23  9:03 AM   Result Value Ref Range    WBC 17.6 (H) 4.8 - 10.8 K/uL    RBC 3.13 (L) 4.20 - 5.40 M/uL    Hemoglobin 8.9 (L) 12.0 - 16.0 g/dL    Hematocrit 26.7 (L) 37.0 - 47.0 %    MCV 85.3 81.4 - 97.8 fL    MCH 28.4 27.0 - 33.0 pg    MCHC 33.3 32.2 - 35.5 g/dL    RDW 46.1 35.9 - 50.0 fL    Platelet Count 142 (L) 164 - 446 K/uL    MPV 12.1 9.0 - 12.9 fL       Assessment and Plan  PPH with blood transfusion  No heavy bleeding or foul vaginal discharge     Continue Routine postpartum care   Ambulate  Home tomorrow

## 2023-07-21 NOTE — CARE PLAN
The patient is Stable - Low risk of patient condition declining or worsening    Shift Goals  Clinical Goals: VSS,light lochia,incision dressing clean,dry and intact,pain control  Patient Goals: pain control,breast and formula feed  Family Goals: rest,breast and formula feed    Progress made toward(s) clinical / shift goals:  Transparent film dressing intact with small,old sanguinous drainage underneath. Otherwise,progressing with all other goals.    Patient is not progressing towards the following goals:

## 2023-07-21 NOTE — LACTATION NOTE
This note was copied from a baby's chart.  Follow up lactation : LC came to mom's room for follow support. Mom has started putting mom to breast more often and still offering bottles. Mom states that football hold works better. LC asked mom to call next time baby is ready to feed and LC can assist. LC demonstrated how to swaddle baby and notice large amount of emesis of formula on blankets. LC encouraged mother to breast feed and offer less formula if breast feeding. LC discussed supply and demand with mother, and encouraged cued based feedings of 8 or more times in 24 hours.  Mom will stay due to C/S. Baby has a current weight loss of 4.9% and has voided and stooled several times since birth.  Lactation to offer support as needed in the morning

## 2023-07-21 NOTE — PROGRESS NOTES
Assumed care of patient at change of shift. Discussed POC and feeding plan with patient and answered all questions.

## 2023-07-21 NOTE — PROGRESS NOTES
"Celia Kilgoreallanes Soto PP day 1 POD 1    Subjective: Abdominal pain. no, ambulating .yes, tolerating liquids .yes, tolerating regular diet .yes, flatus.yes, BM .no, Bleeding .light, voiding .yes,dizziness .no, breast feeding.yes, breast tenderness .no    /75   Pulse 89   Temp 36.6 °C (97.9 °F) (Temporal)   Resp 18   Ht 1.575 m (5' 2\")   Wt 72.6 kg (160 lb)   SpO2 95%   Breast Exam: Tenderness .no, Engourgement .no, Mastitis .no  Abdomen soft, non-tender. BS normal. No masses,  No organomegaly  Incision: dry and intact  Fundus Tenderness:no, Below umbilicus:Yes, firm  Perineumperineum intact  ExtremitiesNormal    Meds:   No current facility-administered medications on file prior to encounter.     Current Outpatient Medications on File Prior to Encounter   Medication Sig Dispense Refill    escitalopram (LEXAPRO) 20 MG tablet Take 20 mg by mouth every day. Taking 10 mg day      albuterol 108 (90 Base) MCG/ACT Aero Soln inhalation aerosol Inhale 1-2 Puffs every 6 hours as needed for Shortness of Breath. 8.5 g 0       Lab:   Recent Results (from the past 48 hour(s))   COD - Adult (Type and Screen)    Collection Time: 07/19/23  1:15 PM   Result Value Ref Range    ABO Grouping Only O     Rh Grouping Only POS     Antibody Screen-Cod NEG     Component R       R99                 Red Cells, LR       [id number]   transfused   07/19/23   21:23      Product Type R99     Dispense Status transfused     Unit Number (Barcoded) [id number]     Product Code (Barcoded) O1678B01     Blood Type (Barcoded) 5100     Component R       R99                 Red Cells, LR       [id number]   transfused   07/19/23   23:35      Product Type R99     Dispense Status transfused     Unit Number (Barcoded) [id number]     Product Code (Barcoded) E5346G35     Blood Type (Barcoded) 5100    CBC WITH DIFFERENTIAL    Collection Time: 07/19/23  7:50 PM   Result Value Ref Range    WBC 19.9 (H) 4.8 - 10.8 K/uL    RBC 2.28 (L) 4.20 - 5.40 " M/uL    Hemoglobin 6.6 (L) 12.0 - 16.0 g/dL    Hematocrit 20.7 (L) 37.0 - 47.0 %    MCV 90.8 81.4 - 97.8 fL    MCH 28.9 27.0 - 33.0 pg    MCHC 31.9 (L) 32.2 - 35.5 g/dL    RDW 45.8 35.9 - 50.0 fL    Platelet Count 168 164 - 446 K/uL    MPV 12.4 9.0 - 12.9 fL    Neutrophils-Polys 91.30 (H) 44.00 - 72.00 %    Lymphocytes 3.70 (L) 22.00 - 41.00 %    Monocytes 4.30 0.00 - 13.40 %    Eosinophils 0.00 0.00 - 6.90 %    Basophils 0.20 0.00 - 1.80 %    Immature Granulocytes 0.50 0.00 - 0.90 %    Nucleated RBC 0.00 0.00 - 0.20 /100 WBC    Neutrophils (Absolute) 18.20 (H) 1.82 - 7.42 K/uL    Lymphs (Absolute) 0.73 (L) 1.00 - 4.80 K/uL    Monos (Absolute) 0.86 (H) 0.00 - 0.85 K/uL    Eos (Absolute) 0.00 0.00 - 0.51 K/uL    Baso (Absolute) 0.04 0.00 - 0.12 K/uL    Immature Granulocytes (abs) 0.09 0.00 - 0.11 K/uL    NRBC (Absolute) 0.00 K/uL   Comp Metabolic Panel    Collection Time: 07/19/23  7:50 PM   Result Value Ref Range    Sodium 135 135 - 145 mmol/L    Potassium 4.0 3.6 - 5.5 mmol/L    Chloride 103 96 - 112 mmol/L    Co2 19 (L) 20 - 33 mmol/L    Anion Gap 13.0 7.0 - 16.0    Glucose 116 (H) 65 - 99 mg/dL    Bun 10 8 - 22 mg/dL    Creatinine 1.25 0.50 - 1.40 mg/dL    Calcium 7.3 (L) 8.5 - 10.5 mg/dL    AST(SGOT) 18 12 - 45 U/L    ALT(SGPT) 12 2 - 50 U/L    Alkaline Phosphatase 132 (H) 30 - 99 U/L    Total Bilirubin 0.2 0.1 - 1.5 mg/dL    Albumin 2.4 (L) 3.2 - 4.9 g/dL    Total Protein 4.2 (L) 6.0 - 8.2 g/dL    Globulin 1.8 (L) 1.9 - 3.5 g/dL    A-G Ratio 1.3 g/dL   CORRECTED CALCIUM    Collection Time: 07/19/23  7:50 PM   Result Value Ref Range    Correct Calcium 8.6 8.5 - 10.5 mg/dL   ESTIMATED GFR    Collection Time: 07/19/23  7:50 PM   Result Value Ref Range    GFR (CKD-EPI) 61 >60 mL/min/1.73 m 2   CBC without differential- Once in AM regardless of delivery time    Collection Time: 07/20/23  9:03 AM   Result Value Ref Range    WBC 17.6 (H) 4.8 - 10.8 K/uL    RBC 3.13 (L) 4.20 - 5.40 M/uL    Hemoglobin 8.9 (L) 12.0 -  16.0 g/dL    Hematocrit 26.7 (L) 37.0 - 47.0 %    MCV 85.3 81.4 - 97.8 fL    MCH 28.4 27.0 - 33.0 pg    MCHC 33.3 32.2 - 35.5 g/dL    RDW 46.1 35.9 - 50.0 fL    Platelet Count 142 (L) 164 - 446 K/uL    MPV 12.1 9.0 - 12.9 fL       Assessment and Plan  Course complicated by PPH and PRBC x 2 given  No heavy bleeding or foul vaginal discharge     Continue Routine postpartum care   Ambulate  Start iron

## 2023-07-21 NOTE — DISCHARGE PLANNING
Discharge Planning Assessment Post Partum    Reason for Referral: History of depression, ADHD, drug use (cocaine and THC) prior to pregnancy  Address: [address] [place] [id number]  Phone: [phone]  Type of Living Situation: living with parents   Mom Diagnosis: Pregnancy,   Baby Diagnosis: [place]-40.2 weeks  Primary Language: English    Name of Baby: Xander Marcial (: 23)  Father of the Baby involved in baby’s care? Unsure    Prenatal Care: Yes-Dr. Poole  Mom's PCP: ALEJANDRA Deutsch  PCP for new baby: [facility]    Support System: MOB's parents   Coping/Bonding between mother & baby: Yes  Source of Feeding: breast and formula feeding  Supplies for Infant: prepared for infant    Mom's Insurance[place]  Baby Covered on Insurance:Yes  Mother Employed/School: [employer]   Other children in the home/names & ages: first baby    Financial Hardship/Income: No   Mom's Mental status: alert and oriented  Services used prior to admit: Medicaid and recently signed up for [facility]    CPS History: No  Psychiatric History: history of depression and ADHD.  Discussed with mother and provided counseling and support group resources.  MOB has a Therapist that she will continue to follow up with   Domestic Violence History: No  Drug/ETOH History: history of cocaine and THC prior to pregnancy.  Infant's UDS is positive for fentanyl (MOB received fentanyl in labor).    Resources Provided: post partum support and counseling resources, children and family resource list, diaper bank assistance resources, and information for child support   Referrals Made: diaper bank referral provided     Clearance for Discharge: Infant is cleared to discharge home with mother once medically cleared         done 93 verbal cues/1 person assist

## 2023-07-21 NOTE — CARE PLAN
The patient is Stable - Low risk of patient condition declining or worsening    Shift Goals  Clinical Goals: VSS,light lochia,incisiod dressing clean,dry and intact,pain control  Patient Goals: pain control,breast and formula feed  Family Goals: rest,breast and formula feed    Progress made toward(s) clinical / shift goals:  Pain well controlled     Patient is not progressing towards the following goals:

## 2023-07-22 ENCOUNTER — PHARMACY VISIT (OUTPATIENT)
Dept: PHARMACY | Facility: MEDICAL CENTER | Age: 23
End: 2023-07-22
Payer: COMMERCIAL

## 2023-07-22 VITALS
HEIGHT: 62 IN | OXYGEN SATURATION: 97 % | DIASTOLIC BLOOD PRESSURE: 86 MMHG | RESPIRATION RATE: 18 BRPM | SYSTOLIC BLOOD PRESSURE: 120 MMHG | WEIGHT: 160 LBS | TEMPERATURE: 98.5 F | HEART RATE: 90 BPM | BODY MASS INDEX: 29.44 KG/M2

## 2023-07-22 PROCEDURE — RXMED WILLOW AMBULATORY MEDICATION CHARGE: Performed by: OBSTETRICS & GYNECOLOGY

## 2023-07-22 PROCEDURE — 700102 HCHG RX REV CODE 250 W/ 637 OVERRIDE(OP): Performed by: OBSTETRICS & GYNECOLOGY

## 2023-07-22 PROCEDURE — A9270 NON-COVERED ITEM OR SERVICE: HCPCS | Performed by: OBSTETRICS & GYNECOLOGY

## 2023-07-22 RX ORDER — HYDROCODONE BITARTRATE AND ACETAMINOPHEN 5; 325 MG/1; MG/1
1 TABLET ORAL EVERY 4 HOURS PRN
Qty: 30 TABLET | Refills: 0 | Status: SHIPPED | OUTPATIENT
Start: 2023-07-22 | End: 2023-07-29

## 2023-07-22 RX ORDER — IBUPROFEN 800 MG/1
800 TABLET ORAL EVERY 8 HOURS
Qty: 30 TABLET | Refills: 1 | Status: SHIPPED | OUTPATIENT
Start: 2023-07-22

## 2023-07-22 RX ORDER — PSEUDOEPHEDRINE HCL 30 MG
100 TABLET ORAL 2 TIMES DAILY PRN
Qty: 60 CAPSULE | Refills: 1 | Status: SHIPPED | OUTPATIENT
Start: 2023-07-22

## 2023-07-22 RX ADMIN — IBUPROFEN 800 MG: 800 TABLET, FILM COATED ORAL at 11:15

## 2023-07-22 RX ADMIN — PRENATAL WITH FERROUS FUM AND FOLIC ACID 1 TABLET: 3080; 920; 120; 400; 22; 1.84; 3; 20; 10; 1; 12; 200; 27; 25; 2 TABLET ORAL at 09:47

## 2023-07-22 RX ADMIN — FERROUS SULFATE TAB 325 MG (65 MG ELEMENTAL FE) 325 MG: 325 (65 FE) TAB at 09:47

## 2023-07-22 RX ADMIN — ACETAMINOPHEN 1000 MG: 500 TABLET, FILM COATED ORAL at 03:14

## 2023-07-22 RX ADMIN — IBUPROFEN 800 MG: 800 TABLET, FILM COATED ORAL at 03:14

## 2023-07-22 RX ADMIN — OXYCODONE HYDROCHLORIDE 5 MG: 5 TABLET ORAL at 11:15

## 2023-07-22 RX ADMIN — ACETAMINOPHEN 1000 MG: 500 TABLET, FILM COATED ORAL at 09:47

## 2023-07-22 ASSESSMENT — EDINBURGH POSTNATAL DEPRESSION SCALE (EPDS)
I HAVE BLAMED MYSELF UNNECESSARILY WHEN THINGS WENT WRONG: NOT VERY OFTEN
I HAVE FELT SCARED OR PANICKY FOR NO GOOD REASON: NO, NOT AT ALL
I HAVE LOOKED FORWARD WITH ENJOYMENT TO THINGS: AS MUCH AS I EVER DID
I HAVE FELT SAD OR MISERABLE: NOT VERY OFTEN
THINGS HAVE BEEN GETTING ON TOP OF ME: NO, MOST OF THE TIME I HAVE COPED QUITE WELL
I HAVE BEEN ANXIOUS OR WORRIED FOR NO GOOD REASON: NO, NOT AT ALL
I HAVE BEEN SO UNHAPPY THAT I HAVE HAD DIFFICULTY SLEEPING: NOT AT ALL
I HAVE BEEN ABLE TO LAUGH AND SEE THE FUNNY SIDE OF THINGS: AS MUCH AS I ALWAYS COULD
I HAVE BEEN SO UNHAPPY THAT I HAVE BEEN CRYING: NO, NEVER
THE THOUGHT OF HARMING MYSELF HAS OCCURRED TO ME: NEVER

## 2023-07-22 ASSESSMENT — PAIN DESCRIPTION - PAIN TYPE
TYPE: ACUTE PAIN
TYPE: ACUTE PAIN

## 2023-07-22 NOTE — CARE PLAN
The patient is Stable - Low risk of patient condition declining or worsening    Shift Goals  Clinical Goals: vss, pain control  Patient Goals: pain control,breast and formula feed  Family Goals: rest,breast and formula feed    Progress made toward(s) clinical / shift goals:    Problem: Knowledge Deficit - Postpartum  Goal: Patient will verbalize and demonstrate understanding of self and infant care  Outcome: Progressing     Problem: Psychosocial - Postpartum  Goal: Patient will verbalize and demonstrate effective bonding and parenting behavior  Outcome: Progressing     Problem: Altered Physiologic Condition  Goal: Patient physiologically stable as evidenced by normal lochia, palpable uterine involution and vitals within normal limits  Outcome: Progressing     Problem: Infection - Postpartum  Goal: Postpartum patient will be free of signs and symptoms of infection  Outcome: Progressing       Patient is not progressing towards the following goals:

## 2023-07-22 NOTE — LACTATION NOTE
Follow up lactation visit:    Celia reports that she has been putting baby to breast at each feeding time, then continuing to offer supplementation following 15-20 minutes of breastfeeding. She reports mild bilateral nipple tenderness, but denies any sharp pain or nipple damage.    Encouraged patient to call for lactation consultant assistance with next feeding for breast and latch assessment.    Reviewed supply and demand aspect of breastfeeding, and importance of regular breast stimulation in the establishment of a full milk supply. Celia verbalizes understanding.    Feeding Plan:    Cue-based breastfeeding, at least once every 3 hours for a total of 8+ feedings every 24 hours. Supplementation, if desired by MOB, according to supplemental feeding guidelines.    Celia is encouraged to call RN/Lactation Consultant with any further breastfeeding questions during hospital stay.    She will follow up with [facility] or [facility] for outpatient lactation support.

## 2023-07-22 NOTE — DISCHARGE INSTRUCTIONS

## 2023-07-22 NOTE — PROGRESS NOTES
Discharge education and follow up information reviewed who verbalizes understanding and papers were signed. Prescribed meds and information provided, patient verbalized understanding. Left facility escorted by staff.

## 2023-07-22 NOTE — PROGRESS NOTES
Assessment completed. Fundus firm, lochia light. Abd. incisions with Tegaderm with old drainage intact. Plan of care reviewed. Denies pain at this time, will call if pain med intervention needed. Call light within reach, bed at lowest position, and shows no signs of distress at this time.

## 2023-07-22 NOTE — DISCHARGE SUMMARY
Discharge Summary:      Ariela Magallanes Soto    Admit Date:   2023  Discharge Date:  2023     Admitting diagnosis:  Labor and delivery indication for care or intervention [O75.9]  Discharge Diagnosis: Status post  for failure to progress.  Pregnancy Complications: bipolar and late transfer of care  Tubal Ligation:  no     History:  Past Medical History:   Diagnosis Date    Abdominal pain     Apnea, sleep     Asthma     Chickenpox     Constipation     Daytime sleepiness     Depression     Difficulty breathing     Frequent headaches     Frequent urination     Insomnia     Painful joint     Ringing in ears     Snoring     Vision loss     Wears glasses      OB History    Para Term  AB Living   1 1 1     1   SAB IAB Ectopic Molar Multiple Live Births           0 1      # Outcome Date GA Lbr Cristiano/2nd Weight Sex Delivery Anes PTL Lv   1 Term 23 40w2d  3.89 kg (8 lb 9.2 oz) M CS-LTranv EPI N FRANCISCO      Complications: Failure to Progress in Second Stage, Fetal Intolerance     Patient has no known allergies.  There are no problems to display for this patient.    Hospital Course:   23 y.o. , now para 1, was admitted with the above mentioned diagnosis, underwent Induction of Labor,  for failure to progress. Patient postpartum course was unremarkable, with progressive advancement in diet , ambulation and toleration of oral analgesia. Patient without complaints today and desires discharge.      Vitals:    23 1800 23 0600 23 1724 23 0600   BP: 129/75 113/73 139/84 120/86   Pulse: 89 68 (!) 102 90   Resp: 18 17 18 18   Temp: 36.6 °C (97.9 °F) 36.9 °C (98.5 °F) 36.9 °C (98.5 °F) 36.9 °C (98.5 °F)   TempSrc: Temporal Temporal Temporal Temporal   SpO2: 95% 95% 95% 97%   Weight:       Height:         Exam:  Breast Exam: negative  Abdomen: Abdomen soft, non-tender. BS normal. No masses,  No organomegaly  Fundus Non Tender: yes  Incision: dry and  intact  Perineum: perineum intact  Extremity: extremities, peripheral pulses and reflexes normal     Labs:  Recent Labs     07/19/23  1950 07/20/23  0903   WBC 19.9* 17.6*   RBC 2.28* 3.13*   HEMOGLOBIN 6.6* 8.9*   HEMATOCRIT 20.7* 26.7*   MCV 90.8 85.3   MCH 28.9 28.4   MCHC 31.9* 33.3   RDW 45.8 46.1   PLATELETCT 168 142*   MPV 12.4 12.1        Activity:   Discharge to home  Pelvic Rest x 6 weeks    Assessment:  normal postpartum course  Discharge Assessment: No areas of skin breakdown/redness; surgical incision intact/healing     Follow up: 2 week for incision check.      Discharge Meds:   Current Outpatient Medications   Medication Sig Dispense Refill    docusate sodium 100 MG Cap Take 100 mg by mouth 2 times a day as needed for Constipation. 60 Capsule 1    ibuprofen (MOTRIN) 800 MG Tab Take 1 Tablet by mouth every 8 hours. 30 Tablet 1    HYDROcodone-acetaminophen (NORCO) 5-325 MG Tab per tablet Take 1 Tablet by mouth every four hours as needed (pain) for up to 7 days. 30 Tablet 0       Hollie Poole M.D.

## 2024-09-11 ENCOUNTER — OFFICE VISIT (OUTPATIENT)
Dept: URGENT CARE | Facility: PHYSICIAN GROUP | Age: 24
End: 2024-09-11
Payer: MEDICAID

## 2024-09-11 ENCOUNTER — APPOINTMENT (OUTPATIENT)
Dept: URGENT CARE | Facility: PHYSICIAN GROUP | Age: 24
End: 2024-09-11
Payer: MEDICAID

## 2024-09-11 ENCOUNTER — HOSPITAL ENCOUNTER (OUTPATIENT)
Dept: LAB | Facility: MEDICAL CENTER | Age: 24
End: 2024-09-11
Attending: STUDENT IN AN ORGANIZED HEALTH CARE EDUCATION/TRAINING PROGRAM
Payer: MEDICAID

## 2024-09-11 VITALS
HEART RATE: 77 BPM | DIASTOLIC BLOOD PRESSURE: 72 MMHG | OXYGEN SATURATION: 98 % | TEMPERATURE: 98.2 F | WEIGHT: 154 LBS | BODY MASS INDEX: 28.34 KG/M2 | RESPIRATION RATE: 16 BRPM | HEIGHT: 62 IN | SYSTOLIC BLOOD PRESSURE: 104 MMHG

## 2024-09-11 DIAGNOSIS — R11.0 NAUSEA: ICD-10-CM

## 2024-09-11 DIAGNOSIS — K21.9 GASTROESOPHAGEAL REFLUX DISEASE, UNSPECIFIED WHETHER ESOPHAGITIS PRESENT: ICD-10-CM

## 2024-09-11 DIAGNOSIS — R10.11 RUQ PAIN: ICD-10-CM

## 2024-09-11 LAB
ALBUMIN SERPL BCP-MCNC: 4.1 G/DL (ref 3.2–4.9)
ALBUMIN/GLOB SERPL: 1.5 G/DL
ALP SERPL-CCNC: 92 U/L (ref 30–99)
ALT SERPL-CCNC: 11 U/L (ref 2–50)
ANION GAP SERPL CALC-SCNC: 8 MMOL/L (ref 7–16)
AST SERPL-CCNC: 18 U/L (ref 12–45)
BILIRUB SERPL-MCNC: 0.2 MG/DL (ref 0.1–1.5)
BUN SERPL-MCNC: 13 MG/DL (ref 8–22)
CALCIUM ALBUM COR SERPL-MCNC: 8.3 MG/DL (ref 8.5–10.5)
CALCIUM SERPL-MCNC: 8.4 MG/DL (ref 8.5–10.5)
CHLORIDE SERPL-SCNC: 106 MMOL/L (ref 96–112)
CO2 SERPL-SCNC: 24 MMOL/L (ref 20–33)
CREAT SERPL-MCNC: 0.86 MG/DL (ref 0.5–1.4)
GFR SERPLBLD CREATININE-BSD FMLA CKD-EPI: 95 ML/MIN/1.73 M 2
GLOBULIN SER CALC-MCNC: 2.8 G/DL (ref 1.9–3.5)
GLUCOSE SERPL-MCNC: 90 MG/DL (ref 65–99)
POCT INT CON NEG: NEGATIVE
POCT INT CON POS: POSITIVE
POCT URINE PREGNANCY TEST: NEGATIVE
POTASSIUM SERPL-SCNC: 4.3 MMOL/L (ref 3.6–5.5)
PROT SERPL-MCNC: 6.9 G/DL (ref 6–8.2)
SODIUM SERPL-SCNC: 138 MMOL/L (ref 135–145)

## 2024-09-11 PROCEDURE — 3078F DIAST BP <80 MM HG: CPT | Performed by: STUDENT IN AN ORGANIZED HEALTH CARE EDUCATION/TRAINING PROGRAM

## 2024-09-11 PROCEDURE — 81025 URINE PREGNANCY TEST: CPT | Performed by: STUDENT IN AN ORGANIZED HEALTH CARE EDUCATION/TRAINING PROGRAM

## 2024-09-11 PROCEDURE — 80053 COMPREHEN METABOLIC PANEL: CPT

## 2024-09-11 PROCEDURE — 3074F SYST BP LT 130 MM HG: CPT | Performed by: STUDENT IN AN ORGANIZED HEALTH CARE EDUCATION/TRAINING PROGRAM

## 2024-09-11 PROCEDURE — 99214 OFFICE O/P EST MOD 30 MIN: CPT | Performed by: STUDENT IN AN ORGANIZED HEALTH CARE EDUCATION/TRAINING PROGRAM

## 2024-09-11 PROCEDURE — 36415 COLL VENOUS BLD VENIPUNCTURE: CPT

## 2024-09-11 RX ORDER — PROPRANOLOL HCL 10 MG
10 TABLET ORAL DAILY
COMMUNITY

## 2024-09-11 RX ORDER — BUPROPION HYDROCHLORIDE 300 MG/1
300 TABLET ORAL EVERY MORNING
COMMUNITY

## 2024-09-11 RX ORDER — LAMOTRIGINE 100 MG/1
100 TABLET ORAL
COMMUNITY

## 2024-09-11 RX ORDER — LURASIDONE HYDROCHLORIDE 40 MG/1
40 TABLET, FILM COATED ORAL DAILY
COMMUNITY
Start: 2024-09-09

## 2024-09-11 RX ORDER — PANTOPRAZOLE SODIUM 40 MG/1
40 TABLET, DELAYED RELEASE ORAL DAILY
Qty: 90 TABLET | Refills: 0 | Status: SHIPPED | OUTPATIENT
Start: 2024-09-11

## 2024-09-11 RX ORDER — PANTOPRAZOLE SODIUM 40 MG/1
40 TABLET, DELAYED RELEASE ORAL DAILY
Qty: 90 TABLET | Refills: 0 | Status: SHIPPED | OUTPATIENT
Start: 2024-09-11 | End: 2024-09-11

## 2024-09-11 RX ORDER — LEVONORGESTREL 1.5 MG/1
1.5 TABLET ORAL ONCE
COMMUNITY
Start: 2024-09-06

## 2024-09-11 ASSESSMENT — FIBROSIS 4 INDEX: FIB4 SCORE: 0.95

## 2024-09-11 NOTE — PROGRESS NOTES
Subjective:   CHIEF COMPLAINT  Chief Complaint   Patient presents with    Abdominal Pain     stomach pain, lower back pain but now feels its the whole back, bilateral rib pain, 3 days ago was getting cramps, nausea  X 1 + week       HPI  Ariela Magallanes Soto is a 24 y.o. female who presents with a chief complaint of epigastric abdominal pain which developed approximately 1 week ago.  States his pain has improved with soaking and intermittently.  Notices symptoms with spicy food.  States over the last 24 hours she is also experienced bilateral rib pain; right side is worse than the left.  Uncertain if this is related to the epigastric discomfort.  With regards to the rib pain no specific triggers or aggravating factors.  She has tried drinking tea and taking laxatives which not helped.  Reports nausea but no vomiting.  Still has an appetite and is tolerating p.o. intake.  She is not experiencing any fevers.  No cough or shortness of breath.  No dysuria or hematuria.  History of reflux that she manages with omeprazole but has limited relief.  Reports she has seen GI in the past with an upper and lower without evidence of infection.  Took a home pregnancy test yesterday which was negative.    REVIEW OF SYSTEMS  General: no fever or chills  GI: Admits nausea without vomiting  See HPI for further details.    PAST MEDICAL HISTORY  There are no problems to display for this patient.      SURGICAL HISTORY   has a past surgical history that includes primary c section (Bilateral, 7/19/2023).    ALLERGIES  No Known Allergies    CURRENT MEDICATIONS  Home Medications       Reviewed by Kurtis Wolfe D.O. (Physician) on 09/11/24 at 1210  Med List Status: <None>     Medication Last Dose Status   AFTERA 1.5 MG Tab Taking Active   albuterol 108 (90 Base) MCG/ACT Aero Soln inhalation aerosol PRN Active   buPROPion (WELLBUTRIN XL) 300 MG XL tablet Taking Active   docusate sodium 100 MG Cap Not Taking Active   escitalopram  "(LEXAPRO) 20 MG tablet Not Taking Active   ibuprofen (MOTRIN) 800 MG Tab PRN Active   lamoTRIgine (LAMICTAL) 100 MG Tab Taking Active   lurasidone (LATUDA) 40 MG Tab Unknown Active   omeprazole (PRILOSEC) 20 MG delayed-release capsule Taking Active   propranolol (INDERAL) 10 MG Tab Taking Active                    SOCIAL HISTORY  Social History     Tobacco Use    Smoking status: Former     Current packs/day: 0.00     Types: Cigarettes, Electronic Cigarettes     Start date: 2018     Quit date: 10/25/2022     Years since quittin.8    Smokeless tobacco: Never    Tobacco comments:     Would only use e-cigs. Started switching off to cigarettes at the beginning of .   Vaping Use    Vaping status: Every Day    Last attempt to quit: 10/1/2022    Substances: Nicotine, Flavoring    Devices: Disposable, Pre-filled or refillable cartridge, Pre-filled pod   Substance and Sexual Activity    Alcohol use: Not Currently     Alcohol/week: 4.8 oz     Types: 4 Cans of beer, 4 Shots of liquor per week     Comment: This was between 1-3 times a week    Drug use: Not Currently     Frequency: 1.0 times per week     Types: Marijuana, Cocaine     Comment: Cocaine last use 2022. Marijuana tried once around 2022    Sexual activity: Not on file       FAMILY HISTORY  History reviewed. No pertinent family history.       Objective:   PHYSICAL EXAM  VITAL SIGNS: /72 (BP Location: Left arm, Patient Position: Sitting, BP Cuff Size: Adult)   Pulse 77   Temp 36.8 °C (98.2 °F) (Temporal)   Resp 16   Ht 1.575 m (5' 2\")   Wt 69.9 kg (154 lb)   SpO2 98%   Breastfeeding No   BMI 28.17 kg/m²     Gen: no acute distress, normal voice  Skin: dry, intact, moist mucosal membranes  Eyes: No conjunctival injection bilaterally.  Neck: Normal range of motion. No meningeal signs.   Lungs: No increased work of breathing.  CTAB w/ symmetric expansion  CV: RRR w/o murmurs or clicks  Abdomen: Bowel sounds normal, soft, no distention.  " Moderate no  TTP along the RUQ. Mild epigastric TTP.  No rigidity or involuntary guarding.    Psych: normal affect, normal judgement, alert, awake    hCG: Negative    Assessment/Plan:     1. Gastroesophageal reflux disease, unspecified whether esophagitis present  Referral to Gastroenterology    pantoprazole (PROTONIX) 40 MG Tablet Delayed Response    DISCONTINUED: pantoprazole (PROTONIX) 40 MG Tablet Delayed Response      2. Nausea  POCT PREGNANCY      3. RUQ pain  Comp Metabolic Panel      Suspect symptoms secondary to refractory reflux.  She is on a low-dose of omeprazole.  Says she has had an upper and lower endoscopy in the past and does not recall having a history of H. pylori infection.  CMP was ordered ruling out elevated hepatobiliary markers; there was some low calcium and patient was instructed to push fluids.  Labs are reassuring.  I left the patient a voicemail reviewing lab results.   NAD. Tolerating p.o. intake.    -Ordered Protonix  -Avoid spicy foods and NSAIDs  -Ordered referral to gastroenterology given refractory symptoms  -Return to urgent care any new/worsening symptoms or further questions or concerns.  Patient understood everything discussed.  All questions were answered.            Please note that this dictation was created using voice recognition software. I have made a reasonable attempt to correct obvious errors, but I expect that there are errors of grammar and possibly content that I did not discover before finalizing the note.

## 2024-10-23 ENCOUNTER — HOSPITAL ENCOUNTER (OUTPATIENT)
Dept: LAB | Facility: MEDICAL CENTER | Age: 24
End: 2024-10-23
Payer: MEDICAID

## 2024-10-23 LAB
25(OH)D3 SERPL-MCNC: 19 NG/ML (ref 30–100)
ALBUMIN SERPL BCP-MCNC: 4.3 G/DL (ref 3.2–4.9)
ALBUMIN/GLOB SERPL: 1.7 G/DL
ALP SERPL-CCNC: 88 U/L (ref 30–99)
ALT SERPL-CCNC: 14 U/L (ref 2–50)
ANION GAP SERPL CALC-SCNC: 12 MMOL/L (ref 7–16)
AST SERPL-CCNC: 16 U/L (ref 12–45)
BILIRUB SERPL-MCNC: 0.4 MG/DL (ref 0.1–1.5)
BUN SERPL-MCNC: 11 MG/DL (ref 8–22)
CALCIUM ALBUM COR SERPL-MCNC: 8.8 MG/DL (ref 8.5–10.5)
CALCIUM SERPL-MCNC: 9 MG/DL (ref 8.5–10.5)
CHLORIDE SERPL-SCNC: 104 MMOL/L (ref 96–112)
CHOLEST SERPL-MCNC: 139 MG/DL (ref 100–199)
CO2 SERPL-SCNC: 24 MMOL/L (ref 20–33)
CREAT SERPL-MCNC: 0.73 MG/DL (ref 0.5–1.4)
GFR SERPLBLD CREATININE-BSD FMLA CKD-EPI: 116 ML/MIN/1.73 M 2
GLOBULIN SER CALC-MCNC: 2.6 G/DL (ref 1.9–3.5)
GLUCOSE SERPL-MCNC: 86 MG/DL (ref 65–99)
HDLC SERPL-MCNC: 49 MG/DL
LDLC SERPL CALC-MCNC: 73 MG/DL
POTASSIUM SERPL-SCNC: 3.9 MMOL/L (ref 3.6–5.5)
PROT SERPL-MCNC: 6.9 G/DL (ref 6–8.2)
SODIUM SERPL-SCNC: 140 MMOL/L (ref 135–145)
T4 FREE SERPL-MCNC: 1.07 NG/DL (ref 0.93–1.7)
TRIGL SERPL-MCNC: 85 MG/DL (ref 0–149)
TSH SERPL-ACNC: 0.64 UIU/ML (ref 0.35–5.5)

## 2024-10-23 PROCEDURE — 85025 COMPLETE CBC W/AUTO DIFF WBC: CPT

## 2024-10-23 PROCEDURE — 84443 ASSAY THYROID STIM HORMONE: CPT

## 2024-10-23 PROCEDURE — 83036 HEMOGLOBIN GLYCOSYLATED A1C: CPT

## 2024-10-23 PROCEDURE — 80053 COMPREHEN METABOLIC PANEL: CPT

## 2024-10-23 PROCEDURE — 36415 COLL VENOUS BLD VENIPUNCTURE: CPT

## 2024-10-23 PROCEDURE — 82306 VITAMIN D 25 HYDROXY: CPT

## 2024-10-23 PROCEDURE — 84439 ASSAY OF FREE THYROXINE: CPT

## 2024-10-23 PROCEDURE — 80061 LIPID PANEL: CPT

## 2024-10-24 LAB
BASOPHILS # BLD AUTO: 0.8 % (ref 0–1.8)
BASOPHILS # BLD: 0.07 K/UL (ref 0–0.12)
EOSINOPHIL # BLD AUTO: 0.48 K/UL (ref 0–0.51)
EOSINOPHIL NFR BLD: 5.6 % (ref 0–6.9)
ERYTHROCYTE [DISTWIDTH] IN BLOOD BY AUTOMATED COUNT: 46 FL (ref 35.9–50)
EST. AVERAGE GLUCOSE BLD GHB EST-MCNC: 108 MG/DL
HBA1C MFR BLD: 5.4 % (ref 4–5.6)
HCT VFR BLD AUTO: 40.5 % (ref 37–47)
HGB BLD-MCNC: 12.8 G/DL (ref 12–16)
IMM GRANULOCYTES # BLD AUTO: 0.02 K/UL (ref 0–0.11)
IMM GRANULOCYTES NFR BLD AUTO: 0.2 % (ref 0–0.9)
LYMPHOCYTES # BLD AUTO: 1.45 K/UL (ref 1–4.8)
LYMPHOCYTES NFR BLD: 16.9 % (ref 22–41)
MCH RBC QN AUTO: 30 PG (ref 27–33)
MCHC RBC AUTO-ENTMCNC: 31.6 G/DL (ref 32.2–35.5)
MCV RBC AUTO: 94.8 FL (ref 81.4–97.8)
MONOCYTES # BLD AUTO: 0.64 K/UL (ref 0–0.85)
MONOCYTES NFR BLD AUTO: 7.4 % (ref 0–13.4)
NEUTROPHILS # BLD AUTO: 5.94 K/UL (ref 1.82–7.42)
NEUTROPHILS NFR BLD: 69.1 % (ref 44–72)
NRBC # BLD AUTO: 0 K/UL
NRBC BLD-RTO: 0 /100 WBC (ref 0–0.2)
PLATELET # BLD AUTO: 276 K/UL (ref 164–446)
PMV BLD AUTO: 11.5 FL (ref 9–12.9)
RBC # BLD AUTO: 4.27 M/UL (ref 4.2–5.4)
WBC # BLD AUTO: 8.6 K/UL (ref 4.8–10.8)

## 2025-05-05 ENCOUNTER — OFFICE VISIT (OUTPATIENT)
Dept: URGENT CARE | Facility: CLINIC | Age: 25
End: 2025-05-05
Payer: MEDICAID

## 2025-05-05 VITALS
RESPIRATION RATE: 12 BRPM | HEART RATE: 84 BPM | TEMPERATURE: 99.1 F | DIASTOLIC BLOOD PRESSURE: 84 MMHG | OXYGEN SATURATION: 98 % | SYSTOLIC BLOOD PRESSURE: 116 MMHG | WEIGHT: 160.5 LBS | BODY MASS INDEX: 29.53 KG/M2 | HEIGHT: 62 IN

## 2025-05-05 DIAGNOSIS — A53.9 SYPHILIS: ICD-10-CM

## 2025-05-05 PROCEDURE — 99999 PR NO CHARGE: CPT

## 2025-05-05 ASSESSMENT — ENCOUNTER SYMPTOMS
SHORTNESS OF BREATH: 0
DIARRHEA: 0
PALPITATIONS: 0
HEMOPTYSIS: 0
FOCAL WEAKNESS: 0
SEIZURES: 0
MYALGIAS: 0
SENSORY CHANGE: 0
FEVER: 0
BACK PAIN: 0
DOUBLE VISION: 0
CHILLS: 0
DIAPHORESIS: 0
EYE DISCHARGE: 0
FALLS: 0
VOMITING: 0
WEAKNESS: 0
FLANK PAIN: 0
SPEECH CHANGE: 0
PHOTOPHOBIA: 0
SINUS PAIN: 0
SORE THROAT: 0
EYE REDNESS: 0
HEARTBURN: 0
STRIDOR: 0
TINGLING: 0
HEADACHES: 0
COUGH: 0
ABDOMINAL PAIN: 0
WEIGHT LOSS: 0
BLURRED VISION: 0
WHEEZING: 0
DIZZINESS: 0
LOSS OF CONSCIOUSNESS: 0
TREMORS: 0
NAUSEA: 0
EYE PAIN: 0
NECK PAIN: 0
SPUTUM PRODUCTION: 0

## 2025-05-05 ASSESSMENT — FIBROSIS 4 INDEX: FIB4 SCORE: 0.42

## 2025-05-05 ASSESSMENT — VISUAL ACUITY: OU: 1

## 2025-05-05 NOTE — PROGRESS NOTES
Chief Complaint   Patient presents with    Other     Tested positive for syphilis at [facility] on 4/25, req rx for Penicillin gk 20 million unit.        HISTORY OF PRESENT ILLNESS: Patient is a pleasant 25 y.o. female who presents to urgent care today for treatment for syphilis, she tested positive on 4/25. She tells me she has not been having symptoms and she went into her GYN for complaints of vaginal bleeding everyday after having the Nexplanon placed. She was screened for sexually transmitted diseases.  She was previously sexually active with one partner, but has not been sexually active since she found out she tested positive for syphilis.     There are no active problems to display for this patient.      Allergies:Patient has no known allergies.    Current Outpatient Medications Ordered in Epic   Medication Sig Dispense Refill    propranolol (INDERAL) 10 MG Tab Take 10 mg by mouth every day.      lurasidone (LATUDA) 40 MG Tab Take 40 mg by mouth every day.      buPROPion (WELLBUTRIN XL) 300 MG XL tablet Take 300 mg by mouth every morning.      lamoTRIgine (LAMICTAL) 100 MG Tab Take 100 mg by mouth every day.      AFTERA 1.5 MG Tab Take 1.5 mg by mouth one time.      pantoprazole (PROTONIX) 40 MG Tablet Delayed Response Take 1 Tablet by mouth every day. 90 Tablet 0    ibuprofen (MOTRIN) 800 MG Tab Take 1 Tablet by mouth every 8 hours. 30 Tablet 1    albuterol 108 (90 Base) MCG/ACT Aero Soln inhalation aerosol Inhale 1-2 Puffs every 6 hours as needed for Shortness of Breath. 8.5 g 0    docusate sodium 100 MG Cap Take 100 mg by mouth 2 times a day as needed for Constipation. (Patient not taking: Reported on 5/5/2025) 60 Capsule 1    escitalopram (LEXAPRO) 20 MG tablet Take 20 mg by mouth every day. Taking 10 mg day (Patient not taking: Reported on 5/5/2025)       No current [facility]-ordered facility-administered medications on file.       Past Medical History:   Diagnosis Date    Abdominal pain     Apnea, sleep      Asthma     Chickenpox     Constipation     Daytime sleepiness     Depression     Difficulty breathing     Frequent headaches     Frequent urination     Insomnia     Painful joint     Ringing in ears     Snoring     Vision loss     Wears glasses        Social History     Tobacco Use    Smoking status: Former     Current packs/day: 0.00     Types: Cigarettes, Electronic Cigarettes     Start date: 2018     Quit date: 10/25/2022     Years since quittin.5    Smokeless tobacco: Never    Tobacco comments:     Would only use e-cigs. Started switching off to cigarettes at the beginning of .   Vaping Use    Vaping status: Former    Quit date: 10/1/2022   Substance Use Topics    Alcohol use: Yes     Alcohol/week: 4.8 oz     Types: 4 Cans of beer, 4 Shots of liquor per week     Comment: occ    Drug use: Not Currently     Frequency: 1.0 times per week     Types: Marijuana, Cocaine     Comment: Cocaine last use 2022. Marijuana tried once around 2022       No family status information on file.   History reviewed. No pertinent family history.    Review of Systems   Constitutional:  Negative for chills, diaphoresis, fever, malaise/fatigue and weight loss.   HENT:  Negative for congestion, ear discharge, ear pain, hearing loss, nosebleeds, sinus pain, sore throat and tinnitus.    Eyes:  Negative for blurred vision, double vision, photophobia, pain, discharge and redness.   Respiratory:  Negative for cough, hemoptysis, sputum production, shortness of breath, wheezing and stridor.    Cardiovascular:  Negative for chest pain and palpitations.   Gastrointestinal:  Negative for abdominal pain, diarrhea, heartburn, nausea and vomiting.   Genitourinary:  Negative for dysuria, flank pain, frequency, hematuria and urgency.   Musculoskeletal:  Negative for back pain, falls, joint pain, myalgias and neck pain.   Skin:  Negative for itching and rash.   Neurological:  Negative for dizziness, tingling, tremors, sensory  "change, speech change, focal weakness, seizures, loss of consciousness, weakness and headaches.       Exam:  /84 (BP Location: Right arm, Patient Position: Sitting, BP Cuff Size: Adult)   Pulse 84   Temp 37.3 °C (99.1 °F) (Temporal)   Resp 12   Ht 1.575 m (5' 2\")   Wt 72.8 kg (160 lb 8 oz)   SpO2 98%   Physical Exam  Constitutional:       General: She is awake.      Appearance: Normal appearance. She is well-developed, well-groomed and normal weight.   HENT:      Head: Normocephalic and atraumatic.   Eyes:      General: Lids are normal. Lids are everted, no foreign bodies appreciated. Vision grossly intact. Gaze aligned appropriately.      Extraocular Movements: Extraocular movements intact.      Conjunctiva/sclera: Conjunctivae normal.   Cardiovascular:      Rate and Rhythm: Normal rate and regular rhythm.      Heart sounds: Normal heart sounds, S1 normal and S2 normal.   Pulmonary:      Effort: Pulmonary effort is normal.      Breath sounds: Normal breath sounds and air entry.   Musculoskeletal:      Cervical back: Full passive range of motion without pain, normal range of motion and neck supple.   Neurological:      Mental Status: She is alert.   Psychiatric:         Attention and Perception: Attention and perception normal.         Mood and Affect: Mood and affect normal.         Behavior: Behavior is cooperative.         Thought Content: Thought content normal.         Cognition and Memory: Cognition and memory normal.         Judgment: Judgment normal.             Assessment/Plan:  1. Syphilis      Pt tested positive for Syphilis and presents to urgent care today for treatment, she has not had any symptoms of Syphilis. Unfortunately we don't carry IM Penicillin here at the clinic and we can't order through [facility]. Pt would like to go to the [facility] for treatment and follow-up test of cure. I did advise that her partner be tested as well, and use condoms for projection " against any sexually transmitted diseases. Pt tells me she has not been sexually active since she found out she tested positive.       Supportive care, differential diagnoses, and indications for immediate follow-up discussed with patient.   Pathogenesis of diagnosis discussed including typical length and natural progression.   Instructed to return to clinic or nearest emergency department for any change in condition, further concerns, or worsening of symptoms.  Patient states understanding of the plan of care and discharge instructions.  Instructed to make an appointment, for follow up, with a primary care provider.        Anette Bond, Student KERON

## 2025-05-06 ENCOUNTER — HOSPITAL ENCOUNTER (EMERGENCY)
Facility: MEDICAL CENTER | Age: 25
End: 2025-05-06
Attending: EMERGENCY MEDICINE
Payer: MEDICAID

## 2025-05-06 VITALS
WEIGHT: 156.97 LBS | DIASTOLIC BLOOD PRESSURE: 60 MMHG | HEART RATE: 95 BPM | OXYGEN SATURATION: 96 % | RESPIRATION RATE: 16 BRPM | TEMPERATURE: 98.1 F | HEIGHT: 62 IN | BODY MASS INDEX: 28.89 KG/M2 | SYSTOLIC BLOOD PRESSURE: 115 MMHG

## 2025-05-06 DIAGNOSIS — A53.9 SYPHILIS: ICD-10-CM

## 2025-05-06 PROCEDURE — 99283 EMERGENCY DEPT VISIT LOW MDM: CPT

## 2025-05-06 PROCEDURE — 700111 HCHG RX REV CODE 636 W/ 250 OVERRIDE (IP): Mod: JZ,UD | Performed by: EMERGENCY MEDICINE

## 2025-05-06 PROCEDURE — 96372 THER/PROPH/DIAG INJ SC/IM: CPT

## 2025-05-06 RX ADMIN — PENICILLIN G BENZATHINE 2.4 MILLION UNITS: 2400000 INJECTION, SUSPENSION INTRAMUSCULAR at 13:13

## 2025-05-06 ASSESSMENT — FIBROSIS 4 INDEX: FIB4 SCORE: 0.42

## 2025-05-06 NOTE — ED PROVIDER NOTES
ER Provider Note    Scribed for Arun Hernandez M.D. by Amrik Reynoso. 2025   11:59 AM    Primary Care Provider: CARLITOS Deutsch    CHIEF COMPLAINT  Chief Complaint   Patient presents with    Exposure to STD     Got blood test completed with PCP and positive for syphilis, seen at  yesterday, told to come to the ER.  Told to come and get PCN shot.       EXTERNAL RECORDS REVIEWED  Outpatient Notes Patient tested positive for Syphilis on 25. She was seen at [facility] yesterday, where she was unable to receive treatment.    HPI/ROS  LIMITATION TO HISTORY   Select: : None  OUTSIDE HISTORIAN(S):  None    Ariela Magallanes Soto is a 25 y.o. female who presents to the ED for PCN shot following a positive syphilis test on 25. Patient denies lumps, lesions, discharge, or any other symptoms. Patient states she has not been sexually active since her test and has let her prior partner know. No alleviating or exacerbating factors were noted.    PAST MEDICAL HISTORY  Past Medical History:   Diagnosis Date    Abdominal pain     Apnea, sleep     Asthma     Chickenpox     Constipation     Daytime sleepiness     Depression     Difficulty breathing     Frequent headaches     Frequent urination     Insomnia     Painful joint     Ringing in ears     Snoring     Vision loss     Wears glasses        SURGICAL HISTORY  Past Surgical History:   Procedure Laterality Date    PRIMARY C SECTION Bilateral 2023    Procedure:  SECTION, PRIMARY;  Surgeon: Hollie Poole M.D.;  Location: SURGERY LABOR [facility] DELIVERY;  Service: Labor and Delivery       FAMILY HISTORY  History reviewed. No pertinent family history.    SOCIAL HISTORY   reports that she quit smoking about 2 years ago. Her smoking use included cigarettes and electronic cigarettes. She started smoking about 7 years ago. She has never used smokeless tobacco. She reports current alcohol use of about 4.8 oz of alcohol per week. She reports that she does not  "currently use drugs after having used the following drugs: Marijuana and Cocaine. Frequency: 1.00 time per week.    CURRENT MEDICATIONS  Previous Medications    AFTERA 1.5 MG TAB    Take 1.5 mg by mouth one time.    ALBUTEROL 108 (90 BASE) MCG/ACT AERO SOLN INHALATION AEROSOL    Inhale 1-2 Puffs every 6 hours as needed for Shortness of Breath.    BUPROPION (WELLBUTRIN XL) 300 MG XL TABLET    Take 300 mg by mouth every morning.    DOCUSATE SODIUM 100 MG CAP    Take 100 mg by mouth 2 times a day as needed for Constipation.    ESCITALOPRAM (LEXAPRO) 20 MG TABLET    Take 20 mg by mouth every day. Taking 10 mg day    IBUPROFEN (MOTRIN) 800 MG TAB    Take 1 Tablet by mouth every 8 hours.    LAMOTRIGINE (LAMICTAL) 100 MG TAB    Take 100 mg by mouth every day.    LURASIDONE (LATUDA) 40 MG TAB    Take 40 mg by mouth every day.    PANTOPRAZOLE (PROTONIX) 40 MG TABLET DELAYED RESPONSE    Take 1 Tablet by mouth every day.    PROPRANOLOL (INDERAL) 10 MG TAB    Take 10 mg by mouth every day.       ALLERGIES  No Known Allergies     PHYSICAL EXAM  /74   Pulse 90   Temp 36.5 °C (97.7 °F) (Temporal)   Resp 16   Ht 1.575 m (5' 2\")   Wt 71.2 kg (156 lb 15.5 oz)   LMP 04/27/2025   SpO2 98%   BMI 28.71 kg/m²    Constitutional: No distress,    HENT: Normocephalic, Atraumatic.  Oropharynx moist.   Eyes: EOMI, Conjunctiva normal, No discharge.   CV: Good pulses  Thorax & Lungs: No respiratory distress.   Abdomen: Soft, non-tender  Skin: Warm, Dry, No rash noted    DIAGNOSTIC STUDIES    COURSE & MEDICAL DECISION MAKING         INITIAL ASSESSMENT, COURSE AND PLAN  Care Narrative: Patient recently diagnosed with syphilis.  Unable to receive outpatient Bicillin.  Give the patient IM shot of penicillin.  Will discharge home.  Do not believe the patient needs any more STD testing, she states that she has not been sexually active since she has been tested positive.  She does not have any abdominal pains or vaginal discharge.    11:59 " AM - Patient was evaluated at bedside. The patient will be medicated with Penicillin G Benzathine Injection 2.4 million units for her symptoms. Patient verbalizes understanding and support with my plan of care.     DISPOSITION AND DISCUSSIONS    I have discussed management of the patient with the following physicians and AMBROCIO's:  None    Discussion of management with other [facility] or appropriate source(s): None     Escalation of care considered, and ultimately not performed: Laboratory analysis.    Barriers to care at this time, including but not limited to:  None known .     Decision tools and prescription drugs considered including, but not limited to: Antibiotics   .    The patient will return for new or worsening symptoms and is stable at the time of discharge.    DISPOSITION:  Patient will be discharged home in stable condition.    FOLLOW UP:  [facility], Emergency Dept  [facility] [facility] [id number] [phone]    If symptoms worsen    FINAL DIAGNOSIS  1. Syphilis         Amrik CANTU (Christie), am scribing for, and in the presence of, Arun Hernandez M.D..    Electronically signed by: Amrik Regalado), 5/6/2025    Arun CANTU M.D. personally performed the services described in this documentation, as scribed by Amrik Reynoso in my presence, and it is both accurate and complete.      The note accurately reflects work and decisions made by me.  Arun Hernandez M.D.  5/6/2025  5:22 PM

## 2025-05-06 NOTE — ED NOTES
Pt medicated per Mar. Pt discharged to home. Pt provided education and discharge instructions per ERP. Pt ambulatory out of ED with steady gait.

## 2025-05-06 NOTE — ED TRIAGE NOTES
Pt ambulated. to triage with   Chief Complaint   Patient presents with    Exposure to STD     Got blood test completed with PCP and positive for syphilis, seen at  yesterday, told to come to the ER.  Told to come and get PCN shot.       Pt Informed regarding triage process and verbalized understanding to inform triage tech or RN for any changes in condition. Placed in lobby.

## 2025-05-06 NOTE — ED NOTES
Pt ambulatory to T-4 with steady gait. Pt changed into gown and placed on monitor. Chart up for ERP.

## 2025-05-19 ENCOUNTER — OFFICE VISIT (OUTPATIENT)
Dept: MEDICAL GROUP | Facility: MEDICAL CENTER | Age: 25
End: 2025-05-19
Attending: FAMILY MEDICINE
Payer: MEDICAID

## 2025-05-19 VITALS
OXYGEN SATURATION: 98 % | WEIGHT: 158 LBS | RESPIRATION RATE: 16 BRPM | HEIGHT: 62 IN | HEART RATE: 88 BPM | BODY MASS INDEX: 29.08 KG/M2 | TEMPERATURE: 98.5 F | DIASTOLIC BLOOD PRESSURE: 78 MMHG | SYSTOLIC BLOOD PRESSURE: 110 MMHG

## 2025-05-19 DIAGNOSIS — Z76.89 ENCOUNTER TO ESTABLISH CARE WITH NEW DOCTOR: Primary | ICD-10-CM

## 2025-05-19 DIAGNOSIS — R89.9 ABNORMAL LABORATORY TEST: ICD-10-CM

## 2025-05-19 DIAGNOSIS — F39 MOOD DISORDER (HCC): ICD-10-CM

## 2025-05-19 DIAGNOSIS — B96.89 BV (BACTERIAL VAGINOSIS): ICD-10-CM

## 2025-05-19 DIAGNOSIS — Z00.00 HEALTHCARE MAINTENANCE: ICD-10-CM

## 2025-05-19 DIAGNOSIS — N76.0 BV (BACTERIAL VAGINOSIS): ICD-10-CM

## 2025-05-19 PROCEDURE — 99213 OFFICE O/P EST LOW 20 MIN: CPT | Performed by: FAMILY MEDICINE

## 2025-05-19 PROCEDURE — 3078F DIAST BP <80 MM HG: CPT | Performed by: FAMILY MEDICINE

## 2025-05-19 PROCEDURE — 3074F SYST BP LT 130 MM HG: CPT | Performed by: FAMILY MEDICINE

## 2025-05-19 PROCEDURE — 99214 OFFICE O/P EST MOD 30 MIN: CPT | Performed by: FAMILY MEDICINE

## 2025-05-19 RX ORDER — OMEPRAZOLE 20 MG/1
20 CAPSULE, DELAYED RELEASE ORAL DAILY
COMMUNITY
Start: 2025-04-29

## 2025-05-19 RX ORDER — METRONIDAZOLE 500 MG/1
500 TABLET ORAL 2 TIMES DAILY
COMMUNITY
Start: 2025-04-28 | End: 2025-05-05

## 2025-05-19 RX ORDER — METRONIDAZOLE 500 MG/1
500 TABLET ORAL 2 TIMES DAILY
Qty: 14 TABLET | Refills: 0 | Status: SHIPPED | OUTPATIENT
Start: 2025-05-19 | End: 2025-05-26

## 2025-05-19 RX ORDER — ETONOGESTREL 68 MG/1
1 IMPLANT SUBCUTANEOUS ONCE
COMMUNITY

## 2025-05-19 ASSESSMENT — PATIENT HEALTH QUESTIONNAIRE - PHQ9
CLINICAL INTERPRETATION OF PHQ2 SCORE: 4
5. POOR APPETITE OR OVEREATING: 0 - NOT AT ALL
SUM OF ALL RESPONSES TO PHQ QUESTIONS 1-9: 11

## 2025-05-19 ASSESSMENT — FIBROSIS 4 INDEX: FIB4 SCORE: 0.42

## 2025-05-19 NOTE — PROGRESS NOTES
Verbal consent was acquired by the patient to use [employer] ambient listening note generation during this visit.    Subjective:     CC:    Chief Complaint   Patient presents with    Establish Care       HISTORY OF THE PRESENT ILLNESS: Ariela Magallanes Soto is a 25 y.o. female. This pleasant patient is here today to establish primary care with me and discuss the following issues.     Specialists   Psychiatry    History of Present Illness  The patient presents to establish primary care. She is accompanied by her friend Yenny.    Primary Care Establishment  She was previously under the care of [facility] but experienced numerous issues prompting her to seek a new primary care provider.    Syphilis Diagnosis and Treatment  She was recently diagnosed with syphilis on 04/25/2025 and received treatment with penicillin. This is her first positive STD test, as a previous screening at [facility] in 09/2024 was negative. She has been in a monogamous relationship with the same partner and is currently not sexually active. She uses Nexplanon for pregnancy prevention and does not use condoms. She is not currently in a relationship.  - Onset: Diagnosed on 04/25/2025.  - Character: First positive STD test; previous screening in 09/2024 was negative.  - Treatment: Received penicillin.    HPV Vaccine and Pap Smear  She has expressed interest in receiving the HPV vaccine. She had a Pap smear last fall through [facility] and is scheduled for another one. She was referred to [facility] due to a positive HPV test. A pelvic ultrasound revealed a cyst in her bladder. She had an appointment scheduled to review her test results, but it was canceled, and she was informed that additional labs were required. She has a follow-up appointment scheduled for her Pap smear.    Gastrointestinal Issues  She has a history of gastrointestinal issues and takes omeprazole as needed. She had a colonoscopy in  2018, which was normal, but has not seen a GI doctor recently.  - Treatment: Takes omeprazole as needed.  - Timing: Colonoscopy in 2018 was normal.    Mental Health Conditions  She has a history of depression, bipolar disorder, and anxiety and is under the care of a psychiatrist. She does not currently have a therapist or counselor but has seen one in the past. She has monthly appointments with her psychiatrist. She is on bupropion 300 mg, lamotrigine 100 mg, propranolol 10 mg, and lurasidone 40 mg. She was previously on Lexapro but is no longer taking it.  - Treatment: Monthly appointments with psychiatrist; medications include bupropion, lamotrigine, propranolol, and lurasidone.  - History: Previously on Lexapro.    Bacterial Vaginosis  She tested positive for bacterial vaginosis (BV) on a vaginal swab but did not complete her course of metronidazole. She reports frequent urination but no abnormal vaginal discharge or odor.  - Character: Positive BV test; frequent urination.  - Treatment: Did not complete course of metronidazole.    PAST SURGICAL HISTORY:  -  in     SOCIAL HISTORY  She has reduced her vaping with nicotine to about 3 times a week, having started in 2019 and quit during pregnancy. She resumed vaping approximately a year after her son's birth and has recently cut back. She used to smoke traditional cigarettes and admits to being addicted to vaping. Her current alcohol consumption is about 1 can of beer per week or less, down from a higher amount before her pregnancy. She began drinking at age 19 and drank frequently before her pregnancy. She used marijuana and cocaine pre-pregnancy but has not used any substances since . She reports no injection or IV drug use. She is currently not sexually active and is not in a relationship. She works with [employer] and is studying [employer]. She lives with her parents, siblings, and son and has 2 outdoor cats.    FAMILY  "HISTORY  Her father is prediabetic. No family history of diabetes, cancer, heart disease, early heart attack, or sudden death.      Allergies: Patient has no known allergies.      [id number] - [place], [place] - [address] AT corner of [facility]  [address] [place] [address]  Phone: [phone] Fax: [phone]    [facility] [place]  [address]  [place] [address]  Phone: [phone] Fax: [phone]    [facility] [place]  [address], [address]  [place] [address]  Phone: [phone] Fax: [phone]      Current Medications[1]    Past Medical History[2]    Past Surgical History[3]    Family History   Problem Relation Age of Onset    No Known Problems Brother     No Known Problems Son     Cancer Neg Hx     Heart Disease Neg Hx        Social History[4]  Social Drivers of Health     Alcohol Use: Not on file   Depression: At risk (5/19/2025)    PHQ-2     PHQ-2 Score: 4   Financial Resource Strain: Not on file   Food Insecurity: Not on file   Housing Stability: Not on file   Intimate Partner Violence: Not on file   Physical Activity: Not on file   Social Connections: Not on file   Stress: Not on file   Tobacco Use: Medium Risk (5/19/2025)    Patient History     Smoking Tobacco Use: Former     Smokeless Tobacco Use: Never     Passive Exposure: Not on file   Transportation Needs: Not on file   Utilities: Not on file        Objective:     /78   Pulse 88   Temp 36.9 °C (98.5 °F)   Resp 16   Ht 1.575 m (5' 2.01\")   Wt 71.7 kg (158 lb)   LMP 04/27/2025   SpO2 98%   BMI 28.89 kg/m²   Physical Exam  Constitutional: Alert, no distress  Skin: No rashes in visible areas  Eye: Conjunctiva clear, lids normal  Respiratory: Unlabored respiratory effort on room air, no cough, lungs clear to auscultation bilaterally  CV: RRR  MSK: Normal gait, moves all extremities  Psych: Alert and oriented x3, normal affect and mood    Depression Screening    Little interest or pleasure in doing " things?  1 - several days   Feeling down, depressed , or hopeless? 3 - nearly every day   Trouble falling or staying asleep, or sleeping too much?  2 - more than half the days   Feeling tired or having little energy?  3 - nearly every day   Poor appetite or overeating?  0 - not at all   Feeling bad about yourself - or that you are a failure or have let yourself or your family down? 0 - not at all   Trouble concentrating on things, such as reading the newspaper or watching television? 2 - more than half the days   Moving or speaking so slowly that other people could have noticed.  Or the opposite - being so fidgety or restless that you have been moving around a lot more than usual?  0 - not at all   Thoughts that you would be better off dead, or of hurting yourself?  0 - not at all   Patient Health Questionnaire Score: 11       If depressive symptoms identified deferred to follow up visit unless specifically addressed in assesment and plan.    Interpretation of PHQ-9 Total Score   Score Severity   1-4 No Depression   5-9 Mild Depression   10-14 Moderate Depression   15-19 Moderately Severe Depression   20-27 Severe Depression    Assessment & Plan:   25 y.o. female with the following -    1. Encounter to establish care with new doctor        2. BV (bacterial vaginosis)  metroNIDAZOLE (FLAGYL) 500 MG Tab      3. Abnormal laboratory test  RPR (SYPHILIS)    T.PALLIDUM AB YOBANY (SCREENING)      4. Mood disorder (HCC)        5. Healthcare maintenance          Assessment & Plan  1. Establishment of primary care.  - Order repeat syphilis testing to confirm diagnosis.  - Emphasize lifestyle medicine principles, including nutrition, physical activity, healthy relationships, stress management, restful sleep, and avoidance of risky substances.  - Schedule annual well visit and inform other healthcare providers of new primary care status.    2. Syphilis: Likely false positive.  - Positive test on 04/25/2024 with RPR however TPA was  negative. Patient sought treatment on her own at ER.  - Order repeat syphilis testing to ensure accurate results.  -  on cross-reactivity and importance of confirming diagnosis.    3. Bacterial vaginosis.  - Prescription for metronidazole to be sent to [facility] pharmacy.  - Advise completing the course as prescribed to avoid antibiotic resistance.    4. Depression: Moderate.  - Mood survey score of 11/27.  - Current medications include bupropion 300 mg, lamotrigine 100 mg, propranolol 10 mg, and lurasidone 40 mg.  - Recommend discussing symptoms with psychiatrist during monthly appointments.  - Offer referral for counseling therapy if desired.    5. Gastrointestinal issues.  - History of normal colonoscopy in 2018.  - Continue omeprazole as prescribed.  - Consider referral to GI specialist if symptoms persist.    6. Health Maintenance.  - Recommend HPV vaccine at local pharmacy or public health department.  - History of HPV and abnormal cervical cells.  - Repeat Pap smear scheduled for upcoming visit.    Follow-up  - Repeat syphilis testing in a few weeks.    A comprehensive discussion regarding lifestyle medicine was conducted with the patient, emphasizing the importance of maintaining a healthy plant based diet, engaging in regular physical activity, maintaining healthy social relationships, managing stress, ensuring adequate restful sleep, and avoiding risky substances such as alcohol, drugs, and tobacco.     Return for routine annual wellness exam.      Please be advised that this document was generated using voice recognition software. While I have made reasonable efforts to correct any obvious errors, there may still be grammatical or content inaccuracies that were not identified or corrected prior to finalization.       [1]   Current Outpatient Medications   Medication Sig Dispense Refill    omeprazole (PRILOSEC) 20 MG delayed-release capsule Take 20 mg by mouth every day.      etonogestrel (NEXPLANON) 68  MG Implant implant 1 Each by Subdermal route one time.      metroNIDAZOLE (FLAGYL) 500 MG Tab Take 1 Tablet by mouth 2 times a day for 7 days. 14 Tablet 0    propranolol (INDERAL) 10 MG Tab Take 10 mg by mouth every day.      lurasidone (LATUDA) 40 MG Tab Take 40 mg by mouth every day.      buPROPion (WELLBUTRIN XL) 300 MG XL tablet Take 300 mg by mouth every morning.      lamoTRIgine (LAMICTAL) 100 MG Tab Take 100 mg by mouth every day.      albuterol 108 (90 Base) MCG/ACT Aero Soln inhalation aerosol Inhale 1-2 Puffs every 6 hours as needed for Shortness of Breath. 8.5 g 0     No current facility-administered medications for this visit.   [2]   Past Medical History:  Diagnosis Date    Abdominal pain     Apnea, sleep     Asthma     Chickenpox     Constipation     Daytime sleepiness     Depression     Difficulty breathing     Frequent headaches     Frequent urination     Insomnia     Painful joint     Ringing in ears     Snoring     Vision loss     Wears glasses    [3]   Past Surgical History:  Procedure Laterality Date    PRIMARY C SECTION Bilateral 2023    Procedure:  SECTION, PRIMARY;  Surgeon: Hollie Poole M.D.;  Location: [facility];  Service: Labor and Delivery   [4]   Social History  Tobacco Use    Smoking status: Former     Current packs/day: 0.00     Types: Cigarettes, Electronic Cigarettes     Start date: 2018     Quit date: 10/25/2022     Years since quittin.5    Smokeless tobacco: Never    Tobacco comments:     Would only use e-cigs. Started switching off to cigarettes at the beginning of .   Vaping Use    Vaping status: Some Days    Last attempt to quit: 10/1/2022    Substances: Nicotine   Substance Use Topics    Alcohol use: Yes     Alcohol/week: 0.6 oz     Types: 1 Cans of beer per week     Comment: A can of beer a week, previously drank more    Drug use: Not Currently     Frequency: 1.0 times per week     Types: Marijuana, Cocaine     Comment: Cocaine last use  july 2022. Marijuana tried once around july 2022. None since 2023. Denies IVDU

## 2025-05-21 ENCOUNTER — HOSPITAL ENCOUNTER (OUTPATIENT)
Dept: LAB | Facility: MEDICAL CENTER | Age: 25
End: 2025-05-21
Attending: FAMILY MEDICINE
Payer: MEDICAID

## 2025-05-21 DIAGNOSIS — R89.9 ABNORMAL LABORATORY TEST: ICD-10-CM

## 2025-05-21 LAB — T PALLIDUM AB SER QL IA: NORMAL

## 2025-05-21 PROCEDURE — 36415 COLL VENOUS BLD VENIPUNCTURE: CPT

## 2025-05-21 PROCEDURE — 86780 TREPONEMA PALLIDUM: CPT

## 2025-05-22 ENCOUNTER — RESULTS FOLLOW-UP (OUTPATIENT)
Dept: MEDICAL GROUP | Facility: MEDICAL CENTER | Age: 25
End: 2025-05-22
Payer: MEDICAID

## 2025-05-29 ENCOUNTER — APPOINTMENT (OUTPATIENT)
Dept: LAB | Facility: MEDICAL CENTER | Age: 25
End: 2025-05-29
Payer: MEDICAID

## 2025-06-06 ENCOUNTER — APPOINTMENT (OUTPATIENT)
Dept: ADMISSIONS | Facility: MEDICAL CENTER | Age: 25
End: 2025-06-06
Attending: OBSTETRICS & GYNECOLOGY
Payer: MEDICAID

## 2025-06-10 NOTE — H&P
Attending Physician: Enrrique Willis .M.D      CC: here for pre-op scheduled for .  Robotic assisted laparoscopic right ovarian cystectomy,COld Knife Cone biopsy   all indicated procedures.     here to discuss about management options  Has questions regarding  The   proposed surgery .    HPI persistent right ovarian cyst 4cms with daughter cysts Tumor markers normal.   lsat pap 2024- LGSIL HPV positive.   colpo biopsy- CIN2 @ 6 O clcok ECC negative CIn1 @ 8 and 10 o clock.     talked about excisinal procedure LEEP VS CKCB          Social History     Socioeconomic History    Marital status: Single     Spouse name: Not on file    Number of children: 1    Years of education: Not on file    Highest education level: High school graduate   Occupational History    Not on file   Tobacco Use    Smoking status: Former     Current packs/day: 0.00     Types: Cigarettes, Electronic Cigarettes     Start date: 2018     Quit date: 10/25/2022     Years since quittin.6    Smokeless tobacco: Never    Tobacco comments:     Would only use e-cigs. Started switching off to cigarettes at the beginning of .   Vaping Use    Vaping status: Some Days    Last attempt to quit: 10/1/2022    Substances: Nicotine   Substance and Sexual Activity    Alcohol use: Yes     Alcohol/week: 0.6 oz     Types: 1 Cans of beer per week     Comment: A can of beer a week, previously drank more    Drug use: Not Currently     Frequency: 1.0 times per week     Types: Marijuana, Cocaine     Comment: Cocaine last use 2022. Marijuana tried once around 2022. None since . Denies IVDU    Sexual activity: Not Currently     Birth control/protection: Implant   Other Topics Concern    Not on file   Social History Narrative    Lives with parents, siblings, and son. 2 cats     Social Drivers of Health     Financial Resource Strain: Not on file   Food Insecurity: Not on file   Transportation Needs: Not on file   Physical Activity: Not on file    Stress: Not on file   Social Connections: Not on file   Intimate Partner Violence: Not on file   Housing Stability: Not on file       Active Ambulatory Problems     Diagnosis Date Noted    BV (bacterial vaginosis) 05/19/2025    Abnormal laboratory test 05/19/2025    Mood disorder (HCC) 05/19/2025     Resolved Ambulatory Problems     Diagnosis Date Noted    No Resolved Ambulatory Problems     Past Medical History:   Diagnosis Date    Abdominal pain     Apnea, sleep     Asthma     Chickenpox     Constipation     Daytime sleepiness     Depression     Difficulty breathing     Frequent headaches     Frequent urination     Insomnia     Painful joint     Ringing in ears     Snoring     Vision loss     Wears glasses        Medications Ordered Prior to Encounter[1]    Past Surgical History[2]    Family History   Problem Relation Age of Onset    No Known Problems Brother     No Known Problems Son     Cancer Neg Hx     Heart Disease Neg Hx        Allergies[3]    Review of Systems:     Constitutional: Negative for fever, chills, weight loss, malaise/fatigue and diaphoresis.   HENT: Negative for hearing loss, ear pain, and ear discharge.   Eyes: Negative for blurred vision, double vision,  and redness.   Respiratory: Negative for cough, hemoptysis, sputum production, shortness of breath, wheezing and stridor.   Cardiovascular: Negative for chest pain, palpitations, orthopnea,and PND.   Gastrointestinal: regular bowel and bladder habits   Genitourinary: Negative for dysuria, urgency, frequency, hematuria and flank pain.   Menstrual:    Musculoskeletal: Negative for myalgias, back pain, joint pain and falls.   Skin: Negative for itching and rash.   Neurological: Negative for dizziness, speech change, focal weakness, seizures, loss of consciousness, weakness and headaches.   Endo/Heme/Allergies: Negative for environmental allergies and polydipsia. Does not bruise/bleed easily.   Psychiatric/Behavioral: Negative for depression,  suicidal ideas, hallucinations, memory loss and substance abuse. The patient is not nervous/anxious and does not have insomnia.   Physical Exam: see EMR for current vitals.    There were no vitals filed for this visit.    Constitutional: she is oriented to person, place, and time. she appears well-developed and well-nourished. No  distress.   Head: Normocephalic and atraumatic.   Neck: Normal range of motion. Neck supple. No JVD present. No tracheal deviation present. No thyromegaly present.   Cardiovascular: Normal rate, regular rhythm, normal heart sounds and intact distal pulses. Exam reveals no gallop and no friction rub. No murmur heard.   Pulmonary/Chest: Effort normal and breath sounds normal. No stridor. No respiratory distress. she has no wheezes. She has no rales.     Abdominal: Soft. There is no tenderness. There is no rebound and no guarding.     Musculoskeletal: Normal range of motion. she exhibits no edema and no tenderness.   Neurological: she is alert and oriented to person, place, and time. she has normal reflexes. No cranial nerve deficit. Coordination normal.   Skin: Skin is warm and dry. No rash noted. She is diaphoretic. No erythema. No pallor.   Psychiatric: she has a normal mood and affect. Behavior is normal.   Labs:         Recent Labs          Radiology:        Assessment:      24 y/o      C/S x 1     h/o alcohol depence in remission last use      h/o Cocaine use last use .     on nexplanon since . prolonged bleeding.     lsat pap 2024- LGSIL HPV positive.   colpo biopsy- CIN2 @ 6 O clcok ECC negative CIn1 @ 8 and 10 o clock.     talked about excisinal procedure LEEP VS CKCB     pt would like to go ahed with CKCB as she is going under anesthesia.  for ovarian cystectomy.     pelvic USG- normal anteverted uterus     repeat USG- right ovary enalrged with 4cms cyst and daughter cyst     left ovary  not visualsied today  was normal last exam.      tumor marker slip  given.     plan- discussed options  observation or surgical removal pt would like removal.     talked about Robotic assisted laparoscopic right ovarian cystectomy,COld Knife Cone biopsy   all indicated procedures.      risks benefits reviewed      post op recovery discussed pt wants to go ahed with it.      for well woman exam.            Plan:  She agrees and electes to proceed with surgery as scheduled.     Complete discussion regarding the proposed procedure was conducted both today and throughout the entire kenney-operative period. The procedure: Robotic assisted laparoscopic right ovarian cystectomy,COld Knife Cone biopsy   all indicated procedures.   Was specifically addressed.        Pre-operative instructions given aware to be NPO after midnight prior to surgery and stop necessary medications according to pre-op recomendations. .      Risks of laparoscopic surgery reviewed including but not limited to:  1. Anesthesia reaction  2. Pain  3. Infection, including wound infection  4. Bleeding  5. Damage to internal organs (bowel (2 in 1,000), bladder (5 in 1,000), blood vessels (6 in1,000), nerves,ureters, other organs)  6. Need for additional procedures, including need to convert to laparotomy  7. Blood transfusion confers a 1:1,000,000 chance of HIV transmission and a 1:300,000 chanceof hepatitistransmission. Pt  has elected to accept blood transfusion in the event of an emergency. 8. Post-operative complications including but not limited to pneumonia, blood clots in thelungsand/orlegsthat can be life-threatening, or other complications. 9. Wound separation, incisional hernia (2-3% at 5 years)  And very rarely:  10. Death  I have also explained the possibility of temporary or permanent discomforts, including, but not limitedto,nausea, constipation, urinary discomfort, incisional discomfort, skin numbness and hyperesthesia, fatigueand vaginal dryness.      We discussed the probable length of stay and criteriafor  discharge. We discussed the discomforts after surgery, care she will need at home and time for recovery.I answered all her questions. Patient expresses understanding of these risks and desires to proceed with surgery.  Generated o                             __________________       [1]   No current facility-administered medications on file prior to encounter.     Current Outpatient Medications on File Prior to Encounter   Medication Sig Dispense Refill    omeprazole (PRILOSEC) 20 MG delayed-release capsule Take 20 mg by mouth every day.      etonogestrel (NEXPLANON) 68 MG Implant implant 1 Each by Subdermal route one time.      propranolol (INDERAL) 10 MG Tab Take 10 mg by mouth every day.      lurasidone (LATUDA) 40 MG Tab Take 40 mg by mouth every day.      buPROPion (WELLBUTRIN XL) 300 MG XL tablet Take 300 mg by mouth every morning.      lamoTRIgine (LAMICTAL) 100 MG Tab Take 100 mg by mouth every day.      albuterol 108 (90 Base) MCG/ACT Aero Soln inhalation aerosol Inhale 1-2 Puffs every 6 hours as needed for Shortness of Breath. 8.5 g 0   [2]   Past Surgical History:  Procedure Laterality Date    PRIMARY C SECTION Bilateral 2023    Procedure:  SECTION, PRIMARY;  Surgeon: Hollie Poole M.D.;  Location: [facility];  Service: Labor and Delivery   [3] No Known Allergies

## 2025-06-11 ENCOUNTER — PRE-ADMISSION TESTING (OUTPATIENT)
Dept: ADMISSIONS | Facility: MEDICAL CENTER | Age: 25
End: 2025-06-11
Attending: OBSTETRICS & GYNECOLOGY
Payer: MEDICAID

## 2025-06-11 DIAGNOSIS — Z01.812 PRE-OPERATIVE LABORATORY EXAMINATION: Primary | ICD-10-CM

## 2025-06-11 LAB
ANION GAP SERPL CALC-SCNC: 11 MMOL/L (ref 7–16)
APPEARANCE UR: CLEAR
BACTERIA #/AREA URNS HPF: ABNORMAL /HPF
BASOPHILS # BLD AUTO: 1 % (ref 0–1.8)
BASOPHILS # BLD: 0.08 K/UL (ref 0–0.12)
BILIRUB UR QL STRIP.AUTO: NEGATIVE
BUN SERPL-MCNC: 11 MG/DL (ref 8–22)
CALCIUM SERPL-MCNC: 8.4 MG/DL (ref 8.5–10.5)
CASTS URNS QL MICRO: ABNORMAL /LPF (ref 0–2)
CHLORIDE SERPL-SCNC: 106 MMOL/L (ref 96–112)
CO2 SERPL-SCNC: 22 MMOL/L (ref 20–33)
COLOR UR: YELLOW
CREAT SERPL-MCNC: 0.74 MG/DL (ref 0.5–1.4)
EOSINOPHIL # BLD AUTO: 0.74 K/UL (ref 0–0.51)
EOSINOPHIL NFR BLD: 8.9 % (ref 0–6.9)
EPITHELIAL CELLS 1715: ABNORMAL /HPF (ref 0–5)
ERYTHROCYTE [DISTWIDTH] IN BLOOD BY AUTOMATED COUNT: 42.4 FL (ref 35.9–50)
GFR SERPLBLD CREATININE-BSD FMLA CKD-EPI: 114 ML/MIN/1.73 M 2
GLUCOSE SERPL-MCNC: 89 MG/DL (ref 65–99)
GLUCOSE UR STRIP.AUTO-MCNC: NEGATIVE MG/DL
HCG SERPL QL: NEGATIVE
HCT VFR BLD AUTO: 38.8 % (ref 37–47)
HGB BLD-MCNC: 12.4 G/DL (ref 12–16)
IMM GRANULOCYTES # BLD AUTO: 0.02 K/UL (ref 0–0.11)
IMM GRANULOCYTES NFR BLD AUTO: 0.2 % (ref 0–0.9)
KETONES UR STRIP.AUTO-MCNC: NEGATIVE MG/DL
LEUKOCYTE ESTERASE UR QL STRIP.AUTO: NEGATIVE
LYMPHOCYTES # BLD AUTO: 2.13 K/UL (ref 1–4.8)
LYMPHOCYTES NFR BLD: 25.5 % (ref 22–41)
MCH RBC QN AUTO: 30.3 PG (ref 27–33)
MCHC RBC AUTO-ENTMCNC: 32 G/DL (ref 32.2–35.5)
MCV RBC AUTO: 94.9 FL (ref 81.4–97.8)
MICRO URNS: ABNORMAL
MONOCYTES # BLD AUTO: 0.48 K/UL (ref 0–0.85)
MONOCYTES NFR BLD AUTO: 5.7 % (ref 0–13.4)
MUCOUS THREADS URNS QL MICRO: PRESENT /HPF
NEUTROPHILS # BLD AUTO: 4.91 K/UL (ref 1.82–7.42)
NEUTROPHILS NFR BLD: 58.7 % (ref 44–72)
NITRITE UR QL STRIP.AUTO: NEGATIVE
NRBC # BLD AUTO: 0 K/UL
NRBC BLD-RTO: 0 /100 WBC (ref 0–0.2)
PH UR STRIP.AUTO: 6 [PH] (ref 5–8)
PLATELET # BLD AUTO: 306 K/UL (ref 164–446)
PMV BLD AUTO: 10.8 FL (ref 9–12.9)
POTASSIUM SERPL-SCNC: 3.8 MMOL/L (ref 3.6–5.5)
PROT UR QL STRIP: NEGATIVE MG/DL
RBC # BLD AUTO: 4.09 M/UL (ref 4.2–5.4)
RBC # URNS HPF: ABNORMAL /HPF (ref 0–2)
RBC UR QL AUTO: ABNORMAL
SODIUM SERPL-SCNC: 139 MMOL/L (ref 135–145)
SP GR UR STRIP.AUTO: 1.02
UROBILINOGEN UR STRIP.AUTO-MCNC: 1 EU/DL
WBC # BLD AUTO: 8.4 K/UL (ref 4.8–10.8)
WBC #/AREA URNS HPF: ABNORMAL /HPF

## 2025-06-11 PROCEDURE — 85025 COMPLETE CBC W/AUTO DIFF WBC: CPT

## 2025-06-11 PROCEDURE — 36415 COLL VENOUS BLD VENIPUNCTURE: CPT

## 2025-06-11 PROCEDURE — 80048 BASIC METABOLIC PNL TOTAL CA: CPT

## 2025-06-11 PROCEDURE — 84703 CHORIONIC GONADOTROPIN ASSAY: CPT

## 2025-06-11 PROCEDURE — 81001 URINALYSIS AUTO W/SCOPE: CPT

## 2025-06-11 RX ORDER — ARIPIPRAZOLE 5 MG/1
5 TABLET ORAL DAILY
COMMUNITY

## 2025-06-11 RX ORDER — LAMOTRIGINE 150 MG/1
150 TABLET ORAL DAILY
COMMUNITY

## 2025-06-11 NOTE — PREADMIT AVS NOTE
Current Medications   Medication Instructions    LAMICTAL 150 MG tablet Continue taking as prescribed.    ABILIFY 5 MG tablet Continue taking as prescribed.    omeprazole (PRILOSEC) 20 MG delayed-release capsule Continue taking as prescribed.    etonogestrel (NEXPLANON) 68 MG Implant implant Follow instructions from surgeon or specialist.    propranolol (INDERAL) 10 MG Tab Continue taking as prescribed.    buPROPion (WELLBUTRIN XL) 300 MG XL tablet Continue taking as prescribed.    albuterol 108 (90 Base) MCG/ACT Aero Soln inhalation aerosol As needed medication, may take if needed, including morning of procedure

## 2025-06-17 ENCOUNTER — ANESTHESIA EVENT (OUTPATIENT)
Dept: SURGERY | Facility: MEDICAL CENTER | Age: 25
End: 2025-06-17
Payer: MEDICAID

## 2025-06-18 ENCOUNTER — ANESTHESIA (OUTPATIENT)
Dept: SURGERY | Facility: MEDICAL CENTER | Age: 25
End: 2025-06-18
Payer: MEDICAID

## 2025-06-18 ENCOUNTER — HOSPITAL ENCOUNTER (OUTPATIENT)
Facility: MEDICAL CENTER | Age: 25
End: 2025-06-18
Attending: OBSTETRICS & GYNECOLOGY | Admitting: OBSTETRICS & GYNECOLOGY
Payer: MEDICAID

## 2025-06-18 VITALS
RESPIRATION RATE: 12 BRPM | DIASTOLIC BLOOD PRESSURE: 53 MMHG | HEIGHT: 62 IN | TEMPERATURE: 97.5 F | BODY MASS INDEX: 28.64 KG/M2 | HEART RATE: 77 BPM | SYSTOLIC BLOOD PRESSURE: 91 MMHG | WEIGHT: 155.65 LBS | OXYGEN SATURATION: 93 %

## 2025-06-18 DIAGNOSIS — Z98.890 POST-OPERATIVE STATE: Primary | ICD-10-CM

## 2025-06-18 LAB
HCG UR QL: NEGATIVE
PATHOLOGY CONSULT NOTE: NORMAL

## 2025-06-18 PROCEDURE — 160047 HCHG PACU  - EA ADDL 30 MINS PHASE II: Performed by: OBSTETRICS & GYNECOLOGY

## 2025-06-18 PROCEDURE — 700101 HCHG RX REV CODE 250: Mod: UD | Performed by: OBSTETRICS & GYNECOLOGY

## 2025-06-18 PROCEDURE — 700102 HCHG RX REV CODE 250 W/ 637 OVERRIDE(OP): Mod: UD | Performed by: STUDENT IN AN ORGANIZED HEALTH CARE EDUCATION/TRAINING PROGRAM

## 2025-06-18 PROCEDURE — 700105 HCHG RX REV CODE 258: Mod: UD | Performed by: OBSTETRICS & GYNECOLOGY

## 2025-06-18 PROCEDURE — A9270 NON-COVERED ITEM OR SERVICE: HCPCS | Mod: UD | Performed by: STUDENT IN AN ORGANIZED HEALTH CARE EDUCATION/TRAINING PROGRAM

## 2025-06-18 PROCEDURE — 88307 TISSUE EXAM BY PATHOLOGIST: CPT | Performed by: PATHOLOGY

## 2025-06-18 PROCEDURE — 502714 HCHG ROBOTIC SURGERY SERVICES: Performed by: OBSTETRICS & GYNECOLOGY

## 2025-06-18 PROCEDURE — 160193 HCHG PACU STANDARD - 1ST 60 MINS: Performed by: OBSTETRICS & GYNECOLOGY

## 2025-06-18 PROCEDURE — 160031 HCHG SURGERY MINUTES - 1ST 30 MINS LEVEL 5: Performed by: OBSTETRICS & GYNECOLOGY

## 2025-06-18 PROCEDURE — 88305 TISSUE EXAM BY PATHOLOGIST: CPT | Mod: 59 | Performed by: PATHOLOGY

## 2025-06-18 PROCEDURE — 160015 HCHG STAT PREOP MINUTES: Performed by: OBSTETRICS & GYNECOLOGY

## 2025-06-18 PROCEDURE — 160191 HCHG ANESTHESIA STANDARD: Performed by: OBSTETRICS & GYNECOLOGY

## 2025-06-18 PROCEDURE — 81025 URINE PREGNANCY TEST: CPT

## 2025-06-18 PROCEDURE — 88304 TISSUE EXAM BY PATHOLOGIST: CPT | Mod: 26 | Performed by: PATHOLOGY

## 2025-06-18 PROCEDURE — 160042 HCHG SURGERY MINUTES - EA ADDL 1 MIN LEVEL 5: Performed by: OBSTETRICS & GYNECOLOGY

## 2025-06-18 PROCEDURE — 160002 HCHG RECOVERY MINUTES (STAT): Performed by: OBSTETRICS & GYNECOLOGY

## 2025-06-18 PROCEDURE — 88305 TISSUE EXAM BY PATHOLOGIST: CPT | Mod: 26 | Performed by: PATHOLOGY

## 2025-06-18 PROCEDURE — C1765 ADHESION BARRIER: HCPCS | Performed by: OBSTETRICS & GYNECOLOGY

## 2025-06-18 PROCEDURE — 88304 TISSUE EXAM BY PATHOLOGIST: CPT | Performed by: PATHOLOGY

## 2025-06-18 PROCEDURE — 700101 HCHG RX REV CODE 250: Mod: UD | Performed by: STUDENT IN AN ORGANIZED HEALTH CARE EDUCATION/TRAINING PROGRAM

## 2025-06-18 PROCEDURE — 700111 HCHG RX REV CODE 636 W/ 250 OVERRIDE (IP): Mod: UD | Performed by: STUDENT IN AN ORGANIZED HEALTH CARE EDUCATION/TRAINING PROGRAM

## 2025-06-18 PROCEDURE — 88307 TISSUE EXAM BY PATHOLOGIST: CPT | Mod: 26 | Performed by: PATHOLOGY

## 2025-06-18 PROCEDURE — 160048 HCHG OR STATISTICAL LEVEL 1-5: Performed by: OBSTETRICS & GYNECOLOGY

## 2025-06-18 PROCEDURE — 700111 HCHG RX REV CODE 636 W/ 250 OVERRIDE (IP): Mod: UD | Performed by: OBSTETRICS & GYNECOLOGY

## 2025-06-18 PROCEDURE — 160025 RECOVERY II MINUTES (STATS): Performed by: OBSTETRICS & GYNECOLOGY

## 2025-06-18 PROCEDURE — 160046 HCHG PACU - 1ST 60 MINS PHASE II: Performed by: OBSTETRICS & GYNECOLOGY

## 2025-06-18 RX ORDER — IBUPROFEN 600 MG/1
600 TABLET, FILM COATED ORAL EVERY 6 HOURS PRN
Qty: 30 TABLET | Refills: 0 | Status: SHIPPED | OUTPATIENT
Start: 2025-06-18

## 2025-06-18 RX ORDER — CEFAZOLIN SODIUM 1 G/3ML
INJECTION, POWDER, FOR SOLUTION INTRAMUSCULAR; INTRAVENOUS PRN
Status: DISCONTINUED | OUTPATIENT
Start: 2025-06-18 | End: 2025-06-18 | Stop reason: SURG

## 2025-06-18 RX ORDER — GLYCOPYRROLATE 0.2 MG/ML
INJECTION INTRAMUSCULAR; INTRAVENOUS PRN
Status: DISCONTINUED | OUTPATIENT
Start: 2025-06-18 | End: 2025-06-18 | Stop reason: SURG

## 2025-06-18 RX ORDER — PHENYLEPHRINE HCL IN 0.9% NACL 1 MG/10 ML
SYRINGE (ML) INTRAVENOUS PRN
Status: DISCONTINUED | OUTPATIENT
Start: 2025-06-18 | End: 2025-06-18 | Stop reason: SURG

## 2025-06-18 RX ORDER — HYDROMORPHONE HYDROCHLORIDE 1 MG/ML
0.1 INJECTION, SOLUTION INTRAMUSCULAR; INTRAVENOUS; SUBCUTANEOUS
Status: DISCONTINUED | OUTPATIENT
Start: 2025-06-18 | End: 2025-06-18 | Stop reason: HOSPADM

## 2025-06-18 RX ORDER — OXYCODONE HCL 5 MG/5 ML
5 SOLUTION, ORAL ORAL
Status: COMPLETED | OUTPATIENT
Start: 2025-06-18 | End: 2025-06-18

## 2025-06-18 RX ORDER — HYDROMORPHONE HYDROCHLORIDE 2 MG/ML
INJECTION, SOLUTION INTRAMUSCULAR; INTRAVENOUS; SUBCUTANEOUS PRN
Status: DISCONTINUED | OUTPATIENT
Start: 2025-06-18 | End: 2025-06-18 | Stop reason: SURG

## 2025-06-18 RX ORDER — HYDROMORPHONE HYDROCHLORIDE 1 MG/ML
0.4 INJECTION, SOLUTION INTRAMUSCULAR; INTRAVENOUS; SUBCUTANEOUS
Status: DISCONTINUED | OUTPATIENT
Start: 2025-06-18 | End: 2025-06-18 | Stop reason: HOSPADM

## 2025-06-18 RX ORDER — LIDOCAINE HYDROCHLORIDE 40 MG/ML
SOLUTION TOPICAL PRN
Status: DISCONTINUED | OUTPATIENT
Start: 2025-06-18 | End: 2025-06-18 | Stop reason: SURG

## 2025-06-18 RX ORDER — BUPIVACAINE HYDROCHLORIDE AND EPINEPHRINE 2.5; 5 MG/ML; UG/ML
INJECTION, SOLUTION EPIDURAL; INFILTRATION; INTRACAUDAL; PERINEURAL
Status: DISCONTINUED
Start: 2025-06-18 | End: 2025-06-18 | Stop reason: HOSPADM

## 2025-06-18 RX ORDER — ONDANSETRON 2 MG/ML
4 INJECTION INTRAMUSCULAR; INTRAVENOUS
Status: COMPLETED | OUTPATIENT
Start: 2025-06-18 | End: 2025-06-18

## 2025-06-18 RX ORDER — LIDOCAINE HYDROCHLORIDE 20 MG/ML
INJECTION, SOLUTION EPIDURAL; INFILTRATION; INTRACAUDAL; PERINEURAL PRN
Status: DISCONTINUED | OUTPATIENT
Start: 2025-06-18 | End: 2025-06-18 | Stop reason: SURG

## 2025-06-18 RX ORDER — ACETIC ACID 0.25 G/100ML
IRRIGANT IRRIGATION
Status: DISCONTINUED
Start: 2025-06-18 | End: 2025-06-18 | Stop reason: HOSPADM

## 2025-06-18 RX ORDER — EPHEDRINE SULFATE 50 MG/ML
INJECTION, SOLUTION INTRAVENOUS PRN
Status: DISCONTINUED | OUTPATIENT
Start: 2025-06-18 | End: 2025-06-18 | Stop reason: SURG

## 2025-06-18 RX ORDER — LABETALOL HYDROCHLORIDE 5 MG/ML
5 INJECTION, SOLUTION INTRAVENOUS
Status: DISCONTINUED | OUTPATIENT
Start: 2025-06-18 | End: 2025-06-18 | Stop reason: HOSPADM

## 2025-06-18 RX ORDER — SCOPOLAMINE 1 MG/3D
1 PATCH, EXTENDED RELEASE TRANSDERMAL
Status: DISCONTINUED | OUTPATIENT
Start: 2025-06-18 | End: 2025-06-18 | Stop reason: HOSPADM

## 2025-06-18 RX ORDER — EPHEDRINE SULFATE 50 MG/ML
5 INJECTION, SOLUTION INTRAVENOUS
Status: DISCONTINUED | OUTPATIENT
Start: 2025-06-18 | End: 2025-06-18 | Stop reason: HOSPADM

## 2025-06-18 RX ORDER — OXYCODONE AND ACETAMINOPHEN 5; 325 MG/1; MG/1
1 TABLET ORAL EVERY 4 HOURS PRN
Qty: 15 TABLET | Refills: 0 | Status: SHIPPED | OUTPATIENT
Start: 2025-06-18 | End: 2025-06-22

## 2025-06-18 RX ORDER — METHOCARBAMOL 100 MG/ML
1000 INJECTION, SOLUTION INTRAMUSCULAR; INTRAVENOUS
Status: COMPLETED | OUTPATIENT
Start: 2025-06-18 | End: 2025-06-18

## 2025-06-18 RX ORDER — BUPIVACAINE HYDROCHLORIDE 2.5 MG/ML
INJECTION, SOLUTION EPIDURAL; INFILTRATION; INTRACAUDAL; PERINEURAL
Status: DISCONTINUED | OUTPATIENT
Start: 2025-06-18 | End: 2025-06-18 | Stop reason: HOSPADM

## 2025-06-18 RX ORDER — ROCURONIUM BROMIDE 10 MG/ML
INJECTION, SOLUTION INTRAVENOUS PRN
Status: DISCONTINUED | OUTPATIENT
Start: 2025-06-18 | End: 2025-06-18 | Stop reason: SURG

## 2025-06-18 RX ORDER — SODIUM CHLORIDE, SODIUM LACTATE, POTASSIUM CHLORIDE, CALCIUM CHLORIDE 600; 310; 30; 20 MG/100ML; MG/100ML; MG/100ML; MG/100ML
INJECTION, SOLUTION INTRAVENOUS CONTINUOUS
Status: ACTIVE | OUTPATIENT
Start: 2025-06-18 | End: 2025-06-18

## 2025-06-18 RX ORDER — DIPHENHYDRAMINE HYDROCHLORIDE 50 MG/ML
12.5 INJECTION, SOLUTION INTRAMUSCULAR; INTRAVENOUS
Status: DISCONTINUED | OUTPATIENT
Start: 2025-06-18 | End: 2025-06-18 | Stop reason: HOSPADM

## 2025-06-18 RX ORDER — HYDRALAZINE HYDROCHLORIDE 20 MG/ML
5 INJECTION INTRAMUSCULAR; INTRAVENOUS
Status: DISCONTINUED | OUTPATIENT
Start: 2025-06-18 | End: 2025-06-18 | Stop reason: HOSPADM

## 2025-06-18 RX ORDER — BUPIVACAINE HYDROCHLORIDE AND EPINEPHRINE 2.5; 5 MG/ML; UG/ML
INJECTION, SOLUTION EPIDURAL; INFILTRATION; INTRACAUDAL; PERINEURAL
Status: DISCONTINUED | OUTPATIENT
Start: 2025-06-18 | End: 2025-06-18 | Stop reason: HOSPADM

## 2025-06-18 RX ORDER — HYDROMORPHONE HYDROCHLORIDE 1 MG/ML
0.2 INJECTION, SOLUTION INTRAMUSCULAR; INTRAVENOUS; SUBCUTANEOUS
Status: DISCONTINUED | OUTPATIENT
Start: 2025-06-18 | End: 2025-06-18 | Stop reason: HOSPADM

## 2025-06-18 RX ORDER — ALBUTEROL SULFATE 5 MG/ML
2.5 SOLUTION RESPIRATORY (INHALATION)
Status: DISCONTINUED | OUTPATIENT
Start: 2025-06-18 | End: 2025-06-18 | Stop reason: HOSPADM

## 2025-06-18 RX ORDER — MIDAZOLAM HYDROCHLORIDE 1 MG/ML
INJECTION INTRAMUSCULAR; INTRAVENOUS PRN
Status: DISCONTINUED | OUTPATIENT
Start: 2025-06-18 | End: 2025-06-18 | Stop reason: SURG

## 2025-06-18 RX ORDER — ONDANSETRON 2 MG/ML
INJECTION INTRAMUSCULAR; INTRAVENOUS PRN
Status: DISCONTINUED | OUTPATIENT
Start: 2025-06-18 | End: 2025-06-18 | Stop reason: SURG

## 2025-06-18 RX ORDER — KETOROLAC TROMETHAMINE 15 MG/ML
INJECTION, SOLUTION INTRAMUSCULAR; INTRAVENOUS PRN
Status: DISCONTINUED | OUTPATIENT
Start: 2025-06-18 | End: 2025-06-18 | Stop reason: SURG

## 2025-06-18 RX ORDER — ESMOLOL HYDROCHLORIDE 10 MG/ML
INJECTION INTRAVENOUS PRN
Status: DISCONTINUED | OUTPATIENT
Start: 2025-06-18 | End: 2025-06-18 | Stop reason: SURG

## 2025-06-18 RX ORDER — OXYCODONE HCL 5 MG/5 ML
10 SOLUTION, ORAL ORAL
Status: COMPLETED | OUTPATIENT
Start: 2025-06-18 | End: 2025-06-18

## 2025-06-18 RX ORDER — HALOPERIDOL 5 MG/ML
1 INJECTION INTRAMUSCULAR
Status: DISCONTINUED | OUTPATIENT
Start: 2025-06-18 | End: 2025-06-18 | Stop reason: HOSPADM

## 2025-06-18 RX ORDER — FERRIC SUBSULFATE 0.21 G/G
LIQUID TOPICAL
Status: DISCONTINUED | OUTPATIENT
Start: 2025-06-18 | End: 2025-06-18 | Stop reason: HOSPADM

## 2025-06-18 RX ORDER — DEXAMETHASONE SODIUM PHOSPHATE 4 MG/ML
INJECTION, SOLUTION INTRA-ARTICULAR; INTRALESIONAL; INTRAMUSCULAR; INTRAVENOUS; SOFT TISSUE PRN
Status: DISCONTINUED | OUTPATIENT
Start: 2025-06-18 | End: 2025-06-18 | Stop reason: SURG

## 2025-06-18 RX ORDER — ACETAMINOPHEN 500 MG
1000 TABLET ORAL
Status: COMPLETED | OUTPATIENT
Start: 2025-06-18 | End: 2025-06-18

## 2025-06-18 RX ADMIN — METHOCARBAMOL 1000 MG: 100 INJECTION, SOLUTION INTRAMUSCULAR; INTRAVENOUS at 14:47

## 2025-06-18 RX ADMIN — ROCURONIUM BROMIDE 20 MG: 10 INJECTION INTRAVENOUS at 13:21

## 2025-06-18 RX ADMIN — ONDANSETRON 4 MG: 2 INJECTION INTRAMUSCULAR; INTRAVENOUS at 14:14

## 2025-06-18 RX ADMIN — Medication 100 MCG: at 13:02

## 2025-06-18 RX ADMIN — ROCURONIUM BROMIDE 50 MG: 10 INJECTION INTRAVENOUS at 12:54

## 2025-06-18 RX ADMIN — FENTANYL CITRATE 50 MCG: 50 INJECTION, SOLUTION INTRAMUSCULAR; INTRAVENOUS at 14:41

## 2025-06-18 RX ADMIN — LIDOCAINE HYDROCHLORIDE 70 MG: 20 INJECTION, SOLUTION EPIDURAL; INFILTRATION; INTRACAUDAL; PERINEURAL at 12:54

## 2025-06-18 RX ADMIN — Medication 100 MCG: at 13:09

## 2025-06-18 RX ADMIN — LIDOCAINE HYDROCHLORIDE 4 ML: 40 SOLUTION TOPICAL at 12:56

## 2025-06-18 RX ADMIN — ACETAMINOPHEN 1000 MG: 500 TABLET ORAL at 14:50

## 2025-06-18 RX ADMIN — HYDROMORPHONE HYDROCHLORIDE 0.2 MG: 2 INJECTION INTRAMUSCULAR; INTRAVENOUS; SUBCUTANEOUS at 14:13

## 2025-06-18 RX ADMIN — HYDROMORPHONE HYDROCHLORIDE 0.2 MG: 2 INJECTION INTRAMUSCULAR; INTRAVENOUS; SUBCUTANEOUS at 13:28

## 2025-06-18 RX ADMIN — HYDROMORPHONE HYDROCHLORIDE 0.4 MG: 2 INJECTION INTRAMUSCULAR; INTRAVENOUS; SUBCUTANEOUS at 13:24

## 2025-06-18 RX ADMIN — MIDAZOLAM HYDROCHLORIDE 2 MG: 1 INJECTION, SOLUTION INTRAMUSCULAR; INTRAVENOUS at 12:48

## 2025-06-18 RX ADMIN — Medication 100 MCG: at 12:58

## 2025-06-18 RX ADMIN — Medication 100 MCG: at 13:15

## 2025-06-18 RX ADMIN — CEFAZOLIN 2 G: 1 INJECTION, POWDER, FOR SOLUTION INTRAMUSCULAR; INTRAVENOUS at 12:56

## 2025-06-18 RX ADMIN — KETOROLAC TROMETHAMINE 15 MG: 15 INJECTION, SOLUTION INTRAMUSCULAR; INTRAVENOUS at 14:06

## 2025-06-18 RX ADMIN — DEXAMETHASONE SODIUM PHOSPHATE 8 MG: 4 INJECTION INTRA-ARTICULAR; INTRALESIONAL; INTRAMUSCULAR; INTRAVENOUS; SOFT TISSUE at 12:56

## 2025-06-18 RX ADMIN — Medication 100 MCG: at 14:02

## 2025-06-18 RX ADMIN — EPHEDRINE SULFATE 5 MG: 50 INJECTION, SOLUTION INTRAVENOUS at 13:24

## 2025-06-18 RX ADMIN — OXYCODONE HYDROCHLORIDE 10 MG: 5 SOLUTION ORAL at 14:47

## 2025-06-18 RX ADMIN — HYDROMORPHONE HYDROCHLORIDE 0.2 MG: 2 INJECTION INTRAMUSCULAR; INTRAVENOUS; SUBCUTANEOUS at 14:09

## 2025-06-18 RX ADMIN — HYDROMORPHONE HYDROCHLORIDE 0.2 MG: 2 INJECTION INTRAMUSCULAR; INTRAVENOUS; SUBCUTANEOUS at 14:07

## 2025-06-18 RX ADMIN — GLYCOPYRROLATE 0.2 MG: 0.2 INJECTION INTRAMUSCULAR; INTRAVENOUS at 13:24

## 2025-06-18 RX ADMIN — ONDANSETRON 4 MG: 2 INJECTION INTRAMUSCULAR; INTRAVENOUS at 16:25

## 2025-06-18 RX ADMIN — Medication 100 MCG: at 12:52

## 2025-06-18 RX ADMIN — SUGAMMADEX 200 MG: 100 INJECTION, SOLUTION INTRAVENOUS at 14:17

## 2025-06-18 RX ADMIN — ESMOLOL HYDROCHLORIDE 40 MG: 100 INJECTION, SOLUTION INTRAVENOUS at 13:30

## 2025-06-18 RX ADMIN — SODIUM CHLORIDE, POTASSIUM CHLORIDE, SODIUM LACTATE AND CALCIUM CHLORIDE: 600; 310; 30; 20 INJECTION, SOLUTION INTRAVENOUS at 12:48

## 2025-06-18 RX ADMIN — FENTANYL CITRATE 50 MCG: 50 INJECTION, SOLUTION INTRAMUSCULAR; INTRAVENOUS at 13:11

## 2025-06-18 RX ADMIN — EPHEDRINE SULFATE 10 MG: 50 INJECTION, SOLUTION INTRAVENOUS at 13:15

## 2025-06-18 RX ADMIN — Medication 100 MCG: at 13:13

## 2025-06-18 RX ADMIN — FENTANYL CITRATE 50 MCG: 50 INJECTION, SOLUTION INTRAMUSCULAR; INTRAVENOUS at 12:52

## 2025-06-18 RX ADMIN — PROPOFOL 150 MG: 10 INJECTION, EMULSION INTRAVENOUS at 12:54

## 2025-06-18 ASSESSMENT — PAIN DESCRIPTION - PAIN TYPE
TYPE: SURGICAL PAIN

## 2025-06-18 ASSESSMENT — FIBROSIS 4 INDEX: FIB4 SCORE: 0.38

## 2025-06-18 NOTE — DISCHARGE INSTRUCTIONS
Special instructions: Pelvic rest for 2 weeks    If any questions arise, call your provider.  If your provider is not available, please feel free to call the Surgical Center at [phone].    MEDICATIONS: Resume taking daily medication.  Take prescribed pain medication with food.  If no medication is prescribed, you may take non-aspirin pain medication if needed.  PAIN MEDICATION CAN BE VERY CONSTIPATING.  Take a stool softener or laxative such as senokot, pericolace, or milk of magnesia if needed.    Last pain medication given at     2:45 pm: Tylenol and Oxycodone    What to Expect Post Anesthesia    Rest and take it easy for the first 24 hours.  A responsible adult is recommended to remain with you during that time.  It is normal to feel sleepy.  We encourage you to not do anything that requires balance, judgment or coordination.    FOR 24 HOURS DO NOT:  Drive, operate machinery or run household appliances.  Drink beer or alcoholic beverages.  Make important decisions or sign legal documents.    To avoid nausea, slowly advance diet as tolerated, avoiding spicy or greasy foods for the first day.  Add more substantial food to your diet according to your provider's instructions.  Babies can be fed formula or breast milk as soon as they are hungry.  INCREASE FLUIDS AND FIBER TO AVOID CONSTIPATION.    MILD FLU-LIKE SYMPTOMS ARE NORMAL.  YOU MAY EXPERIENCE GENERALIZED MUSCLE ACHES, THROAT IRRITATION, HEADACHE AND/OR SOME NAUSEA.    What to Expect Post Anesthesia    Rest and take it easy for the first 24 hours.  A responsible adult is recommended to remain with you during that time.  It is normal to feel sleepy.  We encourage you to not do anything that requires balance, judgment or coordination.

## 2025-06-18 NOTE — OP REPORT
PreOp Diagnosis  :  Right ovarian cyst   CIN2 @ 6 O clcok on colposcopy bx.     PostOp Diagnosis:     Right ovarian cyst .  Left ovarian cyst,   left paratubal cyst.  CIN2 @ 6 O clcok on colposcopy bx.       Procedure(s):  ROBOTIC ASSISTED LAPAROSCOPIC RIGHT OVARIAN CYSTECTOMY, CURRETAGE, COLD KNIFE CONE BIOPSY, LEFT OVARIAN CYSTECTOMY, LEFT PARATUBAL CYST - Wound Class: Clean  CONE BIOPSY, CERVIX - Wound Class: Clean Contaminated    Surgeon(s):  Lazaro Osuna M.D.    Anesthesiologist/Type of Anesthesia:  Anesthesiologist: Yuliet Best M.D./General    Surgical Staff:  Circulator: Dara Garcia R.N.  Scrub Person: Sherri Oneill Assist: Antolin Marr R.N.    Specimens removed if any:  ID Type Source Tests Collected by Time Destination   A : endocervical curettings Other Other PATHOLOGY SPECIMEN Lazaro Osuna M.D. 6/18/2025 1333    B : cold knife cone Other Other PATHOLOGY SPECIMEN Lazaro Osuna M.D. 6/18/2025 1333    C : Right ovarian cyst wall Tissue Ovary PATHOLOGY SPECIMEN Lazaro Osuna M.D. 6/18/2025 1347    D : left side paratubal cyst Other Other PATHOLOGY SPECIMEN Lazaro Osuna M.D. 6/18/2025 1351    E : left cyst wall Other Other PATHOLOGY SPECIMEN Lazaro Osuna M.D. 6/18/2025 1357        Estimated Blood Loss: minimal.     Findings:  CErvix grossly normal light rosalind stain @ 6 O clcok.   normal uterus both tubes zulma right ovarin cyst simple, leftovarian cyst simple left paratubal cyst.     Complications: none     Robotic right and left Ovarian cystectony , left paratubal cyst excision.     DESCRIPTION OF PROCEDURE      The patient was taken to the operating room where general anesthesia was induced without difficulty.  The patient was then  placed in the dorsal lithotomy position and examined under anesthesia findings as noted above.  Prepped and draped in a sterile fashion.  The bladder was Oro catheterized.       A self retaining  speculum was  placed in vagina. The cervix was grasped with a single-toothed tenaculum at the 12’o clock position .stay sutures placed @ 3 and 9 O  clock position.The cervix was infiltrated with lidocaine 1% 30 ml all around the cervix submucosally. Using 11 blade a cone of cervix width 2 cms depth of 1,5 cms was excised and sent for pathology.  Bed and endocervical canal curetted sent for pathology. Hylka manipulator introduced and held in place.    After regloving attention was then turned toward the abdomen, where a Infraumbilical incision was made with a knife and Veress needle was applied into the peritoneal cavity. Peritoneal positioning was confirmed. The CO2 gas was insufflated for creation of an adequate intraperitoneal bubble. The # 8 mm trocar followed by the laparoscope was then passed into the peritoneal cavity.  No injury or bleeding under the port site or entry tract noted.pt was changed to trendelenberg position,remaining ports were placed under direct laparoscopic guidance a 8-mm port, one 10 cms to right and another 10 cms to left and assist port  placed 10 cms to  left port were placed under direct visualization. The 30  degree scope was used through umbillucus port.the monopolar was passed through the right port. The maryland through left port . The left second port for assistant.     The uterus was then elevated and explored the pelvis. Both Ovaries were identified and findings as above.             Right Ovarian cystectomy.       The Right Ovary was exposed and held on the pelvic side wall using monopolar scissors the ovary was incised just underneath the border of the cyst the incision was extended using maryland and monopolar the cyst was was excised clear fluid noted multiple cyst noted and were dissected and excised using maryland and scissors . The ovary bed was coagulated using the monopolar scissors.  Then the right ovarian  bed was sprayed with Justo.     Left Ovarian cystectomy.  Left paratubal  cyst removal       The left Ovary was exposed and held on the pelvic side wall using monopolar scissors the ovary was incised just underneath the border of the cyst the incision was extended using maryland and monopolar the cyst was was excised clear fluid noted multiple cyst noted and were dissected and excised using maryland and scissors . The ovary bed was coagulated using the monopolar scissors.  Then the left ovarian  bed was sprayed with Justo.  The long pedunculated paratubal cyst excised with monoplar     All specimen sent for pathology       After Ensuring hemostasis and good peristalsis of both ureters  all instruments removed .    The all the port and the umbilical port were then closed the skin incisions were  closed using a running subcuticular suture of 4-0 Monocryl and Steri-Strips were applied to the skin. The patient tolerated all the procedures well. Estimated blood loss was approximately 50 mL. Instrument and sponge counts were correct.   The patient was extubated and taken to the recovery room  in good and stable condition.

## 2025-06-18 NOTE — ANESTHESIA POSTPROCEDURE EVALUATION
Patient: Ariela Magallanes Soto    Procedure Summary       Date: 06/18/25 Room / Location: [facility] ROOM 21 / SURGERY SAME DAY [facility]    Anesthesia Start: 1248 Anesthesia Stop: 1431    Procedures:       ROBOTIC ASSISTED LAPAROSCOPIC RIGHT OVARIAN CYSTECTOMY, CURRETAGE, COLD KNIFE CONE BIOPSY, LEFT OVARIAN CYSTECTOMY, LEFT PARATUBAL CYST (Abdomen)      CONE BIOPSY, CERVIX (Cervix) Diagnosis: (BILATERAL OVARIAN CYST, CERVICAL DYSPLASIA)    Surgeons: Lazaro Osuna M.D. Responsible Provider: Yuliet Best M.D.    Anesthesia Type: general ASA Status: 2            Final Anesthesia Type: general  Last vitals  BP   Blood Pressure: (!) 85/39    Temp   36.4 °C (97.5 °F)    Pulse   94   Resp   16    SpO2   99 %      Anesthesia Post Evaluation    Patient location during evaluation: PACU  Patient participation: complete - patient participated  Level of consciousness: awake and alert    Airway patency: patent  Anesthetic complications: no  Cardiovascular status: hemodynamically stable  Respiratory status: acceptable  Hydration status: euvolemic    PONV: none          There were no known notable events for this encounter.     Nurse Pain Score: 0 (NPRS)

## 2025-06-18 NOTE — OR SURGEON
Immediate Post OP Note    PreOp Diagnosis  :  Right ovarian cyst   CIN2 @ 6 O clcok on colposcopy bx.     PostOp Diagnosis:     Right ovarian cyst .  Left ovarian cyst,   left paratubal cyst.  CIN2 @ 6 O clcok on colposcopy bx.       Procedure(s):  ROBOTIC ASSISTED LAPAROSCOPIC RIGHT OVARIAN CYSTECTOMY, CURRETAGE, COLD KNIFE CONE BIOPSY, LEFT OVARIAN CYSTECTOMY, LEFT PARATUBAL CYST - Wound Class: Clean  CONE BIOPSY, CERVIX - Wound Class: Clean Contaminated    Surgeon(s):  Lazaro Osuna M.D.    Anesthesiologist/Type of Anesthesia:  Anesthesiologist: Yuliet Best M.D./General    Surgical Staff:  Circulator: Dara Garcia R.N.  Scrub Person: Sherri Oneill Assist: Antolin Marr R.N.    Specimens removed if any:  ID Type Source Tests Collected by Time Destination   A : endocervical curettings Other Other PATHOLOGY SPECIMEN Lazaro Osuna M.D. 6/18/2025 1333    B : cold knife cone Other Other PATHOLOGY SPECIMEN Lazaro Osuna M.D. 6/18/2025 1333    C : Right ovarian cyst wall Tissue Ovary PATHOLOGY SPECIMEN Lazaro Osuna M.D. 6/18/2025 1347    D : left side paratubal cyst Other Other PATHOLOGY SPECIMEN Lazaro Osuna M.D. 6/18/2025 1351    E : left cyst wall Other Other PATHOLOGY SPECIMEN Lazaro Osuna M.D. 6/18/2025 1357        Estimated Blood Loss: minimal.     Findings:  CErvix grossly normal light rosalind stain @ 6 O clcok.   normal uterus both tubes zulma right ovarin cyst simple, leftovarian cyst simple left paratubal cyst.     Complications: none         6/18/2025 2:07 PM Lazaro Osuna M.D.

## 2025-06-18 NOTE — ANESTHESIA PROCEDURE NOTES
Airway    Date/Time: 6/18/2025 12:56 PM    Performed by: Yuliet Best M.D.  Authorized by: Yuliet Best M.D.    Location:  OR  Urgency:  Elective  Indications for Airway Management:  Anesthesia      Spontaneous Ventilation: absent    Sedation Level:  Deep  Preoxygenated: Yes    Patient Position:  Sniffing  Mask Difficulty Assessment:  1 - vent by mask  Final Airway Type:  Endotracheal airway  Final Endotracheal Airway:  ETT  Cuffed: Yes    Technique Used for Successful ETT Placement:  Direct laryngoscopy    Insertion Site:  Oral  Blade Type:  Trell  Laryngoscope Blade/Videolaryngoscope Blade Size:  3  ETT Size (mm):  6.5  Measured from:  Teeth  ETT to Teeth (cm):  20  Placement Verified by: auscultation and capnometry    Cormack-Lehane Classification:  Grade IIa - partial view of glottis  Number of Attempts at Approach:  1

## 2025-06-18 NOTE — OR NURSING
1431 Report received from Mira MCCRAY and care of patient assumed. Patient resting comfortably at this time.     1519 Patient's mother updated in lobby.     1615- Discharge instructions reviewed and signed with patient and mother at bedside. All questions answered.     1620- Patient feeling nauseous while getting dressed. Medicated with PRN Zofran.     1628- Moderate amount of blood on kenney-pad. Clean pad placed at this time. RN to reassess.     1630- Report handed off to SHAZIA Dewitt

## 2025-06-18 NOTE — ANESTHESIA TIME REPORT
Anesthesia Start and Stop Event Times       Date Time Event    6/18/2025 1220 Ready for Procedure     1248 Anesthesia Start     1431 Anesthesia Stop          Responsible Staff  06/18/25      Name Role Begin End    Yuliet Best M.D. Anesth 1248 1431          Overtime Reason:  no overtime (within assigned shift)    Comments:

## 2025-06-18 NOTE — OR NURSING
1424- Pt to PACU 20 from OR. Bedside report from anesthesiologist and RN.  Attached to monitoring, VSS, breathing is calm and unlabored, Patient is asleep currently. Pt has abdominal laparoscopic sites x4  with gauze and Tegaderm. Incisions CDI. Minimal drainage present on kenney pad. Remains on 6 L oxygen via mask.    1431- Report given to SHAZIA Meyers.

## 2025-06-18 NOTE — ANESTHESIA PREPROCEDURE EVALUATION
Case: [id number] Date/Time: 06/18/25 1245    Procedures:       ROBOTIC ASSISTED LAPAROSCOPIC RIGHT OVARIAN CYSTECTOMY, ALL INDICATED PROCEDURES, COLD KNIFE CONE BIOPSY      CONE BIOPSY, CERVIX    Pre-op diagnosis: RIGHT OVARIAN CYST, CERVICAL DYSPLASIA    Location: [facility] ROOM 21 / SURGERY SAME DAY [facility]    Surgeons: Lazaro Osuna M.D.            25F asthma, BRIANDA, GERD, recent syphilis 4/25/25 s/p PCN (possible false positive), depression, bipolar, anxiety, vaping with nicotine, h/o alcohol dependence now 1 beer per week or less,   h/o marijuana and cocaine use last use [id number]    Relevant Problems   Other   (positive) Mood disorder (HCC)       Physical Exam    Airway   Mallampati: II  TM distance: >3 FB  Neck ROM: full       Cardiovascular - normal exam  Rhythm: regular  Rate: normal    (-) murmur     Dental - normal exam           Pulmonary - normal exam   Abdominal    Neurological - normal exam                   Anesthesia Plan    ASA 2       Plan - general       Airway plan will be ETT          Induction: intravenous    Postoperative Plan: Postoperative administration of opioids is intended.    Pertinent diagnostic labs and testing reviewed    Informed Consent:    Anesthetic plan and risks discussed with patient.    Use of blood products discussed with: patient whom consented to blood products.

## 2025-06-19 NOTE — OR NURSING
1630- Report received from SHAZIA Meyers. Moderate amount of vaginal bleeding preset. RN will reassess in 30 min.    1715- Patient up to restroom. Moderate amount of bleeding noted on pad.     1715- New mirna-pad placed, RN to reassess at 1745.    1725- RN updated Enrrique on patient bleeding.     1745- Mirna pad reassessed. Minimal blood noted on mirna pad. Dr. Osuna notified. PIV removed with tip intact. Discharged home with all belongings.

## 2025-12-03 ENCOUNTER — APPOINTMENT (OUTPATIENT)
Dept: MEDICAL GROUP | Facility: MEDICAL CENTER | Age: 25
End: 2025-12-03
Payer: MEDICAID

## (undated) DEVICE — ELECTRODE DUAL RETURN W/ CORD - (50/PK)

## (undated) DEVICE — APPLICATOR ENDOSCOPIC SURGICEL (5EA/BX)

## (undated) DEVICE — CATHETER IV NON-SAFETY 18 GA X 1 1/4 (50/BX 4BX/CA)

## (undated) DEVICE — GLOVE BIOGEL INDICATOR SZ 6.5 SURGICAL PF LTX - (50PR/BX 4BX/CA)

## (undated) DEVICE — COVER TIP ENDOWRIST HOT SHEAR - (10EA/BX) DA VINCI

## (undated) DEVICE — NEEDLE SPINAL NON-SAFETY 22 GA X 3 (25EA/BX)"

## (undated) DEVICE — NEEDLE INSFL 120MM 14GA VRRS - (20/BX)

## (undated) DEVICE — LACTATED RINGERS INJ 1000 ML - (14EA/CA 60CA/PF)

## (undated) DEVICE — SEAL UNIVERSAL 5MM-12MM (10EA/BX)

## (undated) DEVICE — MASK OXYGEN VNYL ADLT MED CONC WITH 7 FOOT TUBING - (50EA/CA)

## (undated) DEVICE — TOWEL STOP TIMEOUT SAFETY FLAG (40EA/CA)

## (undated) DEVICE — SUTURE 0 VICRYL PLUS CT-1 - 36 INCH (36/BX)

## (undated) DEVICE — SODIUM CHL IRRIGATION 0.9% 1000ML (12EA/CA)

## (undated) DEVICE — BLADE SURGICAL #11 - (50/BX)

## (undated) DEVICE — BAG RETRIEVAL 5MM (10EA/BX)

## (undated) DEVICE — CHLORAPREP 26 ML APPLICATOR - ORANGE TINT(25/CA)

## (undated) DEVICE — PAD SANITARY 11IN MAXI IND WRAPPED (12EA/PK 24PK/CA)

## (undated) DEVICE — WATER IRRIGATION STERILE 1000ML (12EA/CA)

## (undated) DEVICE — DRESSING TRANSPARENT FILM TEGADERM 2.375 X 2.75" (100EA/BX)"

## (undated) DEVICE — CANNULA O2 COMFORT SOFT EAR ADULT 7 FT TUBING (50/CA)

## (undated) DEVICE — HEAD HOLDER JUNIOR/ADULT

## (undated) DEVICE — MEDICINE CUP STERILE 2 OZ (100/CA)

## (undated) DEVICE — GLOVE SZ 7 BIOGEL PI MICRO - PF LF (50PR/BX 4BX/CA)

## (undated) DEVICE — SUTURE GENERAL

## (undated) DEVICE — Device

## (undated) DEVICE — SUTURE 4-0 MONOCRYL PLUS PS-2 - 27 INCH (36/BX)

## (undated) DEVICE — SYSTEM CLEARIFY VISUALIZATION (10EA/PK)

## (undated) DEVICE — DRESSING INTERCEED ABSORBABLE ADHESION BARRIER TC7 (10EA/CA)

## (undated) DEVICE — DRESSING POST OP BORDER 4 X 10 (5EA/BX)

## (undated) DEVICE — GLOVE BIOGEL INDICATOR SZ 7SURGICAL PF LTX - (50/BX 4BX/CA)

## (undated) DEVICE — SUTURE 2-0 VICRYL PLUS SH - 27 INCH (36/BX)

## (undated) DEVICE — PACK C-SECTION (2EA/CA)

## (undated) DEVICE — CANISTER SUCTION RIGID RED 1500CC (40EA/CA)

## (undated) DEVICE — TRAY SPINAL ANESTHESIA NON-SAFETY (10/CA)

## (undated) DEVICE — SUTURE 1 VICRYL PLUS CTX - 36 INCH (36/BX)

## (undated) DEVICE — GLOVE BIOGEL SZ 6 PF LATEX - (50EA/BX 4BX/CA)

## (undated) DEVICE — SUCTION INSTRUMENT YANKAUER BULBOUS TIP W/O VENT (50EA/CA)

## (undated) DEVICE — SLEEVE VASO DVT COMPRESSION CALF MED - (10PR/CA)

## (undated) DEVICE — KIT  I.V. START (100EA/CA)

## (undated) DEVICE — TAPE CLOTH MEDIPORE 6 INCH - (12RL/CA)

## (undated) DEVICE — TUBE SUCTION YANKAUER  1/4 X 6FT (20EA/CA)"

## (undated) DEVICE — TUBE CONNECTING SUCTION - CLEAR PLASTIC STERILE 72 IN (50EA/CA)

## (undated) DEVICE — COVER LIGHT HANDLE FLEXIBLE - SOFT (2EA/PK 80PK/CA)

## (undated) DEVICE — DRAPE ARM BOX OF 20

## (undated) DEVICE — CANISTER SUCTION 3000ML MECHANICAL FILTER AUTO SHUTOFF MEDI-VAC NONSTERILE LF DISP  (40EA/CA)

## (undated) DEVICE — SLEEVE, SEQUENTIAL CALF REG

## (undated) DEVICE — TUBING CLEARLINK DUO-VENT - C-FLO (48EA/CA)

## (undated) DEVICE — DRAPE COLUMN BOX OF 20

## (undated) DEVICE — SYRINGE 10 ML CONTROL LL (25EA/BX 4BX/CA)

## (undated) DEVICE — SET EXTENSION WITH 2 PORTS (48EA/CA) ***PART #2C8610 IS A SUBSTITUTE*****

## (undated) DEVICE — DRESSING NON-ADHERING 8 X 3 - (50/BX)

## (undated) DEVICE — CANISTER SUCTION 3000ML MECHANICAL FILTER AUTO SHUTOFF MEDI-VAC NONSTERILE LF DISP (40EA/CA)

## (undated) DEVICE — HEMOSTAT ABSORBABLE POWDER SURGICEL 3G (5EA/BX)

## (undated) DEVICE — SUTURE 1 CHROMIC CTX ETHICON - (36PK/BX)

## (undated) DEVICE — SWAB  PROCTO 16 STERILIZABLE - (50/BX)

## (undated) DEVICE — SET SUCTION/IRRIGATION WITH DISPOSABLE TIP (6/CA )PART #0250-070-520 IS A SUB

## (undated) DEVICE — SET LEADWIRE 5 LEAD BEDSIDE DISPOSABLE ECG (1SET OF 5/EA)

## (undated) DEVICE — STAPLER SKIN DISP - (6/BX 10BX/CA) VISISTAT

## (undated) DEVICE — OBTURATOR BLADELESS STANDARD 8MM (6EA/BX)

## (undated) DEVICE — GOWN WARMING STANDARD FLEX - (30/CA)

## (undated) DEVICE — TRAY FOLEY CATHETER STATLOCK 16FR SURESTEP (10EA/CA)

## (undated) DEVICE — SENSOR OXIMETER ADULT SPO2 RD SET (20EA/BX)